# Patient Record
Sex: FEMALE | Race: WHITE | NOT HISPANIC OR LATINO | URBAN - METROPOLITAN AREA
[De-identification: names, ages, dates, MRNs, and addresses within clinical notes are randomized per-mention and may not be internally consistent; named-entity substitution may affect disease eponyms.]

---

## 2017-08-27 ENCOUNTER — RADIANT APPOINTMENT (OUTPATIENT)
Dept: GENERAL RADIOLOGY | Facility: CLINIC | Age: 47
End: 2017-08-27
Attending: FAMILY MEDICINE
Payer: COMMERCIAL

## 2017-08-27 ENCOUNTER — OFFICE VISIT (OUTPATIENT)
Dept: URGENT CARE | Facility: URGENT CARE | Age: 47
End: 2017-08-27
Payer: COMMERCIAL

## 2017-08-27 VITALS
TEMPERATURE: 98.8 F | OXYGEN SATURATION: 100 % | SYSTOLIC BLOOD PRESSURE: 110 MMHG | WEIGHT: 145 LBS | HEART RATE: 56 BPM | DIASTOLIC BLOOD PRESSURE: 66 MMHG

## 2017-08-27 DIAGNOSIS — S83.412A SPRAIN OF MEDIAL COLLATERAL LIGAMENT OF LEFT KNEE, INITIAL ENCOUNTER: Primary | ICD-10-CM

## 2017-08-27 DIAGNOSIS — S83.412A SPRAIN OF MEDIAL COLLATERAL LIGAMENT OF LEFT KNEE, INITIAL ENCOUNTER: ICD-10-CM

## 2017-08-27 PROCEDURE — 73562 X-RAY EXAM OF KNEE 3: CPT | Mod: LT

## 2017-08-27 PROCEDURE — 99203 OFFICE O/P NEW LOW 30 MIN: CPT | Performed by: FAMILY MEDICINE

## 2017-08-27 RX ORDER — FLECAINIDE ACETATE 100 MG/1
100 TABLET ORAL 2 TIMES DAILY
COMMUNITY
End: 2017-10-26

## 2017-08-27 RX ORDER — CETIRIZINE HYDROCHLORIDE 10 MG/1
10 TABLET ORAL DAILY
COMMUNITY

## 2017-08-27 RX ORDER — TOPIRAMATE 50 MG/1
50 TABLET, FILM COATED ORAL 2 TIMES DAILY
COMMUNITY
End: 2024-01-15 | Stop reason: DRUGHIGH

## 2017-08-27 NOTE — MR AVS SNAPSHOT
After Visit Summary   8/27/2017    Cheryl Gill    MRN: [id number]           Patient Information     Date Of Birth          1970        Visit Information        Provider Department      8/27/2017 11:35 AM Mayela Coombs MD [facility]        Today's Diagnoses     Sprain of medial collateral ligament of left knee, initial encounter    -  1      Care Instructions      Knee Sprain of the Collateral Ligaments  The knee is a hinge joint supported by four strong ligaments. The two ligaments inside the knee protect this joint from excess forward and backward movement. The ligaments on the outside of the joint prevent side-to-side motion. These are called the collateral ligaments.  The medial collateral ligament is located on the inner side of the joint; and the lateral collateral ligament is on the outer side of the joint.  You have sprained one or both collateral ligaments. A sprain is a tearing of a ligament. The tear may be partial or complete. Diagnosis is made by physical exam. In the case of an acute injury, the knee may be too swollen or painful to examine fully. A more accurate exam can be done after the initial swelling goes down.  Symptoms of a knee sprain include immediate knee swelling, pain, and difficulty walking. Initial treatment includes rest, splinting the knee to reduce movement of the joint, and icing the knee to reduce swelling and pain. You may use over-the-counter pain medicine to control pain, unless another medicine was prescribed. Most sprains will heal in 3 to 6 weeks. A severe injury can take 3 to 4 months to heal. You will also need rehab exercises. Surgery is usually not required for sprains involving only the collateral ligaments.  Home care    Stay off the injured leg as much as possible until you can walk on it without pain. If you have a lot of pain while walking, crutches, or a walker may be prescribed. These can be rented or  purchased at many pharmacies and surgical or orthopedic supply stores. Follow your healthcare provider s advice about when to begin bearing weight on that leg.     If you were given a hook-and-loop closure knee brace, you can remove it to bathe. But leave it in place when walking, sitting, or lying down unless told otherwise.    Apply an ice pack over the injured area for 15 to 20 minutes every 3 to 6 hours. You should do this for the first 24 to 48 hours. You can make an ice pack by filling a plastic bag that seals at the top with ice cubes and then wrapping it with a thin towel. Continue to use ice packs for relief of pain and swelling as needed. As the ice melts, be careful to avoid getting your wrap, splint, or cast wet. After 48 hours, apply heat (warm shower or warm bath) for 15 to 20 minutes several times a day, or alternate ice and heat. You can place the ice pack directly over the splint. If you have to wear a hook-and-loop knee brace, you can open it to apply the ice pack, or heat, directly to the knee. Never put ice directly on the skin. Always wrap the ice in a towel or other type of cloth.    You may use over-the-counter pain medicine to control pain, unless another pain medicine was prescribed. Anti-inflammatory pain medicines, such as ibuprofen or naproxen may be more effective than acetaminophen. If you have chronic liver or kidney disease or ever had a stomach ulcer or GI bleeding, talk with your healthcare provider before using these medicines.  Follow-up care  Follow up with your healthcare provider as advised. Any X-rays you had today don t show any broken bones, breaks, or fractures. Sometimes fractures don t show up on the first X-ray. Bruises and sprains can sometimes hurt as much as a fracture. These injuries can take time to heal completely. If your symptoms don t improve or they get worse, talk with your healthcare provider. You may need a repeat X-ray. If X-rays were taken, you will be  told of any new findings that may affect your care.  Call 911  Call 911 if you have:     Shortness of breath     Chest pain  When to seek medical advice  Call your healthcare provider right away if any of these occur:    Pain or swelling increases    Swelling, redness, or pain in the calf or thigh  Date Last Reviewed: 11/20/2015 2000-2017 [employer]. [address] [place] [address]. All rights reserved. This information is not intended as a substitute for professional medical care. Always follow your healthcare professional's instructions.        Understanding Medial Collateral Ligament Sprain    The knee is a complex joint where the thighbone (femur) meets the shinbone (tibia). Strong tissues called ligaments connect these bones together. Ligaments also keep the bones aligned, so the knee only bends how it is supposed to. The medial collateral ligament (MCL) runs across the knee joint on the medial side of the leg. Injury to this ligament may be very painful. The knee may also not work the way it should.  Causes of an MCL sprain  An MCL sprain often happens when the knee joint is pushed beyond its normal range of motion. It is most common during a blow to the knee from the outside, pushing the knee inward. It may also happen if the knee is forced into a twist. These movements stretch and tear the MCL. Other parts of the knee may be damaged along with the MCL.  Symptoms of an MCL sprain  These include:    Knee pain    Knee swelling    Locking of the joint    Wobbly or unstable feeling in the joint  Treatment for an MCL sprain  Treatment will depend on the severity of the sprain and whether there is damage to other parts of the knee. Options often include:    Rest. This allows the knee to heal. Activities that stress the knee should be avoided. Crutches, a knee brace, or both may also be recommended for a short time.    Cold packs and elevation of the knee. These help reduce swelling and  relieve pain.    Compression. The knee may be wrapped with a bandage to help reduce swelling.    Medicines. These help relieve pain and swelling.    Exercises. These help improve the knee s stability, strength, and range of motion.  If the injury is severe or several parts of the joint are involved, surgery may be an option. Surgery repairs the MCL and any other damaged structures.     When to call your healthcare provider  Call your healthcare provider right away if you have any of these:    Pain, swelling, or instability that doesn t get better with treatment or gets worse    New symptoms   Date Last Reviewed: 3/10/2016    8636-3149 [employer]. [address] [place] [address]. All rights reserved. This information is not intended as a substitute for professional medical care. Always follow your healthcare professional's instructions.        Treating Medial Collateral Ligament (MCL) Problems  There are 2 options for treating an MCL injury: nonsurgical and surgical. Nonsurgical treatment is used much more often. With either option, rehabilitation will be part of your treatment.  Pre-op checklist    Stop taking aspirin and other medicines as advised by your doctor 7 days before surgery.    Arrange to get properly sized crutches to use during recovery.    Don t eat or drink 10 to 12 hours before surgery (or advised by your doctor).    Arrange for someone to drive you home after surgery.   Nonsurgical treatment  This treatment starts with rest, ice, and elevation. This relieves pain and swelling. In the next stage, you begin exercises designed to increase your knee s range of motion, strength, and flexibility. You may need a brace for weeks after your injury. Using crutches or a brace rests your joint, helping it to heal.     Your doctor may secure the ligament to the bone with screws.         Your doctor may stitch or staple your ligament together.    Surgery  Surgery is seldom used to repair  an MCL injury, however, sometimes it is advised, especially if another part of your knee is damaged. Open surgery is used to screw or stitch the MCL back into place. If repair of the original MCL is not possible, an MCL graft may be used. Depending on their location, other knee injuries may be repaired using arthroscopy. With arthroscopy, a tiny camera lets your doctor see inside the joint. Tools inserted through small incisions are used to repair the joint.  After surgery  Right after surgery, you ll spend a few hours in a recovery unit. Your knee will be bandaged, ice will be applied,  and your leg elevated. Depending on the surgery performed, physical therapy may begin shortly after. A brace and crutches are typically used after surgery. You may have restrictions on weight bearing and activity during your healing process.  Date Last Reviewed: 9/8/2015 2000-2017 [employer]. [address] [place] [address]. All rights reserved. This information is not intended as a substitute for professional medical care. Always follow your healthcare professional's instructions.                Follow-ups after your visit        Who to contact     If you have questions or need follow up information about today's clinic visit or your schedule please contact [facility] directly at [phone].  Normal or non-critical lab and imaging results will be communicated to you by MyChart, letter or phone within 4 business days after the clinic has received the results. If you do not hear from us within 7 days, please contact the clinic through [employer] or phone. If you have a critical or abnormal lab result, we will notify you by phone as soon as possible.  Submit refill requests through [employer] or call your pharmacy and they will forward the refill request to us. Please allow 3 business days for your refill to be completed.          Additional Information About Your Visit       "  MyChart Information     [employer] lets you send messages to your doctor, view your test results, renew your prescriptions, schedule appointments and more. To sign up, go to [email or web address] . Click on \"Log in\" on the left side of the screen, which will take you to the Welcome page. Then click on \"Sign up Now\" on the right side of the page.     You will be asked to enter the access code listed below, as well as some personal information. Please follow the directions to create your username and password.     Your access code is: [email or web address]  Expires: 2017 12:55 PM     Your access code will  in 90 days. If you need help or a new code, please call your [facility] clinic or [phone].        Care EveryWhere ID     This is your Care EveryWhere ID. This could be used by other organizations to access your [facility] medical records  [id number]        Your Vitals Were     Pulse Temperature Pulse Oximetry             56 98.8  F (37.1  C) (Oral) 100%          Blood Pressure from Last 3 Encounters:   17 110/66    Weight from Last 3 Encounters:   17 145 lb (65.8 kg)                 Today's Medication Changes          These changes are accurate as of: 17 12:55 PM.  If you have any questions, ask your nurse or doctor.               Start taking these medicines.        Dose/Directions    order for DME   Used for:  Sprain of medial collateral ligament of left knee, initial encounter   Started by:  Mayela Coombs MD        Equipment being ordered: Knee sleeve   Quantity:  1 Device   Refills:  0            Where to get your medicines      Some of these will need a paper prescription and others can be bought over the counter.  Ask your nurse if you have questions.     Bring a paper prescription for each of these medications     order for DME                Primary Care Provider    None Specified       No primary provider on file.        Equal Access to Services     ALFONSO HOLLIS AH: Severiano singh " valentin Barney, wahayesda luqadaha, qaybta kaalmada orlando, rodrigo ramirezadryan jared. So [facility] [phone].    ATENCIÓN: Si habla español, tiene a herbert disposición servicios gratuitos de asistencia lingüística. Tamara al [phone].    We comply with applicable federal civil rights laws and [place] laws. We do not discriminate on the basis of race, color, national origin, age, disability sex, sexual orientation or gender identity.            Thank you!     Thank you for choosing [facility]  for your care. Our goal is always to provide you with excellent care. Hearing back from our patients is one way we can continue to improve our services. Please take a few minutes to complete the written survey that you may receive in the mail after your visit with us. Thank you!             Your Updated Medication List - Protect others around you: Learn how to safely use, store and throw away your medicines at www.disposemymeds.org.          This list is accurate as of: 8/27/17 12:55 PM.  Always use your most recent med list.                   Brand Name Dispense Instructions for use Diagnosis    cetirizine 10 MG tablet    zyrTEC     Take 10 mg by mouth daily        FIORINAL PO           flecainide 100 MG tablet    TAMBOCOR     Take 100 mg by mouth 2 times daily        FLONASE NA           MIDRIN PO           order for DME     1 Device    Equipment being ordered: Knee sleeve    Sprain of medial collateral ligament of left knee, initial encounter       TOPAMAX 50 MG tablet   Generic drug:  topiramate      Take 50 mg by mouth 2 times daily

## 2017-08-27 NOTE — NURSING NOTE
Chief Complaint   Patient presents with     Knee Pain     lt knee pain for past week       Initial /66 (BP Location: Right arm, Patient Position: Chair, Cuff Size: Adult Regular)  Pulse 56  Temp 98.8  F (37.1  C) (Oral)  Wt 145 lb (65.8 kg)  SpO2 100% There is no height or weight on file to calculate BMI.  Medication Reconciliation: complete Lan TOLLIVER

## 2017-08-27 NOTE — PATIENT INSTRUCTIONS
Knee Sprain of the Collateral Ligaments  The knee is a hinge joint supported by four strong ligaments. The two ligaments inside the knee protect this joint from excess forward and backward movement. The ligaments on the outside of the joint prevent side-to-side motion. These are called the collateral ligaments.  The medial collateral ligament is located on the inner side of the joint; and the lateral collateral ligament is on the outer side of the joint.  You have sprained one or both collateral ligaments. A sprain is a tearing of a ligament. The tear may be partial or complete. Diagnosis is made by physical exam. In the case of an acute injury, the knee may be too swollen or painful to examine fully. A more accurate exam can be done after the initial swelling goes down.  Symptoms of a knee sprain include immediate knee swelling, pain, and difficulty walking. Initial treatment includes rest, splinting the knee to reduce movement of the joint, and icing the knee to reduce swelling and pain. You may use over-the-counter pain medicine to control pain, unless another medicine was prescribed. Most sprains will heal in 3 to 6 weeks. A severe injury can take 3 to 4 months to heal. You will also need rehab exercises. Surgery is usually not required for sprains involving only the collateral ligaments.  Home care    Stay off the injured leg as much as possible until you can walk on it without pain. If you have a lot of pain while walking, crutches, or a walker may be prescribed. These can be rented or purchased at many pharmacies and surgical or orthopedic supply stores. Follow your healthcare provider s advice about when to begin bearing weight on that leg.     If you were given a hook-and-loop closure knee brace, you can remove it to bathe. But leave it in place when walking, sitting, or lying down unless told otherwise.    Apply an ice pack over the injured area for 15 to 20 minutes every 3 to 6 hours. You should do this  for the first 24 to 48 hours. You can make an ice pack by filling a plastic bag that seals at the top with ice cubes and then wrapping it with a thin towel. Continue to use ice packs for relief of pain and swelling as needed. As the ice melts, be careful to avoid getting your wrap, splint, or cast wet. After 48 hours, apply heat (warm shower or warm bath) for 15 to 20 minutes several times a day, or alternate ice and heat. You can place the ice pack directly over the splint. If you have to wear a hook-and-loop knee brace, you can open it to apply the ice pack, or heat, directly to the knee. Never put ice directly on the skin. Always wrap the ice in a towel or other type of cloth.    You may use over-the-counter pain medicine to control pain, unless another pain medicine was prescribed. Anti-inflammatory pain medicines, such as ibuprofen or naproxen may be more effective than acetaminophen. If you have chronic liver or kidney disease or ever had a stomach ulcer or GI bleeding, talk with your healthcare provider before using these medicines.  Follow-up care  Follow up with your healthcare provider as advised. Any X-rays you had today don t show any broken bones, breaks, or fractures. Sometimes fractures don t show up on the first X-ray. Bruises and sprains can sometimes hurt as much as a fracture. These injuries can take time to heal completely. If your symptoms don t improve or they get worse, talk with your healthcare provider. You may need a repeat X-ray. If X-rays were taken, you will be told of any new findings that may affect your care.  Call 911  Call 911 if you have:     Shortness of breath     Chest pain  When to seek medical advice  Call your healthcare provider right away if any of these occur:    Pain or swelling increases    Swelling, redness, or pain in the calf or thigh  Date Last Reviewed: 11/20/2015 2000-2017 [employer]. [address] [place] [address], [place] [address]. All rights reserved.  This information is not intended as a substitute for professional medical care. Always follow your healthcare professional's instructions.        Understanding Medial Collateral Ligament Sprain    The knee is a complex joint where the thighbone (femur) meets the shinbone (tibia). Strong tissues called ligaments connect these bones together. Ligaments also keep the bones aligned, so the knee only bends how it is supposed to. The medial collateral ligament (MCL) runs across the knee joint on the medial side of the leg. Injury to this ligament may be very painful. The knee may also not work the way it should.  Causes of an MCL sprain  An MCL sprain often happens when the knee joint is pushed beyond its normal range of motion. It is most common during a blow to the knee from the outside, pushing the knee inward. It may also happen if the knee is forced into a twist. These movements stretch and tear the MCL. Other parts of the knee may be damaged along with the MCL.  Symptoms of an MCL sprain  These include:    Knee pain    Knee swelling    Locking of the joint    Wobbly or unstable feeling in the joint  Treatment for an MCL sprain  Treatment will depend on the severity of the sprain and whether there is damage to other parts of the knee. Options often include:    Rest. This allows the knee to heal. Activities that stress the knee should be avoided. Crutches, a knee brace, or both may also be recommended for a short time.    Cold packs and elevation of the knee. These help reduce swelling and relieve pain.    Compression. The knee may be wrapped with a bandage to help reduce swelling.    Medicines. These help relieve pain and swelling.    Exercises. These help improve the knee s stability, strength, and range of motion.  If the injury is severe or several parts of the joint are involved, surgery may be an option. Surgery repairs the MCL and any other damaged structures.     When to call your healthcare provider  Call  your healthcare provider right away if you have any of these:    Pain, swelling, or instability that doesn t get better with treatment or gets worse    New symptoms   Date Last Reviewed: 3/10/2016    7247-9465 [employer]. [address] [place] [address], [place] [address]. All rights reserved. This information is not intended as a substitute for professional medical care. Always follow your healthcare professional's instructions.        Treating Medial Collateral Ligament (MCL) Problems  There are 2 options for treating an MCL injury: nonsurgical and surgical. Nonsurgical treatment is used much more often. With either option, rehabilitation will be part of your treatment.  Pre-op checklist    Stop taking aspirin and other medicines as advised by your doctor 7 days before surgery.    Arrange to get properly sized crutches to use during recovery.    Don t eat or drink 10 to 12 hours before surgery (or advised by your doctor).    Arrange for someone to drive you home after surgery.   Nonsurgical treatment  This treatment starts with rest, ice, and elevation. This relieves pain and swelling. In the next stage, you begin exercises designed to increase your knee s range of motion, strength, and flexibility. You may need a brace for weeks after your injury. Using crutches or a brace rests your joint, helping it to heal.     Your doctor may secure the ligament to the bone with screws.         Your doctor may stitch or staple your ligament together.    Surgery  Surgery is seldom used to repair an MCL injury, however, sometimes it is advised, especially if another part of your knee is damaged. Open surgery is used to screw or stitch the MCL back into place. If repair of the original MCL is not possible, an MCL graft may be used. Depending on their location, other knee injuries may be repaired using arthroscopy. With arthroscopy, a tiny camera lets your doctor see inside the joint. Tools inserted through small incisions  are used to repair the joint.  After surgery  Right after surgery, you ll spend a few hours in a recovery unit. Your knee will be bandaged, ice will be applied,  and your leg elevated. Depending on the surgery performed, physical therapy may begin shortly after. A brace and crutches are typically used after surgery. You may have restrictions on weight bearing and activity during your healing process.  Date Last Reviewed: 9/8/2015 2000-2017 [employer]. [address] [place] [address], [place] [address]. All rights reserved. This information is not intended as a substitute for professional medical care. Always follow your healthcare professional's instructions.

## 2017-08-27 NOTE — PROGRESS NOTES
SUBJECTIVE:  Chief Complaint   Patient presents with     Knee Pain     lt knee pain for past week     Cheryl Gill is a 46 year old female who has had a left knee pain since 1 weeks ago. Mechanism of injury: unknown . Immediate symptoms: insidious onset of pain, was able to bear weight  , no deformity was noted by the patient. Symptoms have been unchanged since that time. Prior history of related problems: no prior problems with this area in the past.    PMH-  Migraine    ALLERGIES:  Adhesive tape; Imitrex [sumatriptan]; Metal [staples]; and Nsaids     Meds,- flecainide, topiramate, flonase, fiorinal, midrin      No current outpatient prescriptions on file prior to visit.  No current facility-administered medications on file prior to visit.     Social History   Substance Use Topics     Smoking status: Current Every Day Smoker     Smokeless tobacco: Never Used      Comment: 3-4 per day     Alcohol use Not on file       No family history on file.     ROS:  CONSTITUTIONAL:NEGATIVE for fever, chills, change in weight  INTEGUMENTARY/SKIN: NEGATIVE for worrisome rashes, moles or lesions  EYES: NEGATIVE for vision changes or irritation  ENT/MOUTH: NEGATIVE for ear, mouth and throat problems  RESP:NEGATIVE for significant cough or SOB  GI: NEGATIVE for nausea, abdominal pain, heartburn, or change in bowel habits    OBJECTIVE:  /66 (BP Location: Right arm, Patient Position: Chair, Cuff Size: Adult Regular)  Pulse 56  Temp 98.8  F (37.1  C) (Oral)  Wt 145 lb (65.8 kg)  SpO2 100%  Appearance: well developed and well nourished and cooperative.    Knee exam: left    no pain with ROM of the patella       pain with stress to the medial collateral ligament-  Pain with local palpation medial knee  no pain with stress to the lateral collateral ligament  no pain with compression of the meniscus in the medial and lateral compartments  no knee instability with Lachman     able to bear weight and ambulate with  mild  pain  There is not compromise to the distal circulation. Distal pulses are 2+ and capillary refill is brisk.  Motor and sensory function distal to the injured knee is normal  X-ray: no fracture or dislocation noted, pending review by Radiologist.    ASSESSMENT:  Sprain of medial collateral ligament of left knee, initial encounter     - XR Knee Left 3 Views; Future  - order for DME; Equipment being ordered: Knee sleeve    acetaminophen as alternative for pain  Follow-up with primary care or othopedics if persistent symptoms after 2 weeks     Knee sleeve

## 2017-09-14 ENCOUNTER — HOSPITAL ENCOUNTER (EMERGENCY)
Facility: CLINIC | Age: 47
Discharge: HOME | End: 2017-09-14
Attending: EMERGENCY MEDICINE | Admitting: EMERGENCY MEDICINE
Payer: COMMERCIAL

## 2017-09-14 VITALS
HEIGHT: 66 IN | HEART RATE: 59 BPM | SYSTOLIC BLOOD PRESSURE: 107 MMHG | DIASTOLIC BLOOD PRESSURE: 66 MMHG | RESPIRATION RATE: 16 BRPM | OXYGEN SATURATION: 98 % | TEMPERATURE: 97.5 F

## 2017-09-14 DIAGNOSIS — R55 NEAR SYNCOPE: ICD-10-CM

## 2017-09-14 LAB
ANION GAP SERPL CALCULATED.3IONS-SCNC: 11 MMOL/L (ref 3–14)
BASOPHILS # BLD AUTO: 0 10E9/L (ref 0–0.2)
BASOPHILS NFR BLD AUTO: 0.2 %
BUN SERPL-MCNC: 12 MG/DL (ref 7–30)
CALCIUM SERPL-MCNC: 8.7 MG/DL (ref 8.5–10.1)
CHLORIDE SERPL-SCNC: 107 MMOL/L (ref 94–109)
CO2 SERPL-SCNC: 20 MMOL/L (ref 20–32)
CREAT SERPL-MCNC: 0.79 MG/DL (ref 0.52–1.04)
DIFFERENTIAL METHOD BLD: NORMAL
EOSINOPHIL # BLD AUTO: 0.1 10E9/L (ref 0–0.7)
EOSINOPHIL NFR BLD AUTO: 1.5 %
ERYTHROCYTE [DISTWIDTH] IN BLOOD BY AUTOMATED COUNT: 12.4 % (ref 10–15)
GFR SERPL CREATININE-BSD FRML MDRD: 79 ML/MIN/1.7M2
GLUCOSE SERPL-MCNC: 147 MG/DL (ref 70–99)
HCT VFR BLD AUTO: 46.2 % (ref 35–47)
HGB BLD-MCNC: 15.7 G/DL (ref 11.7–15.7)
IMM GRANULOCYTES # BLD: 0 10E9/L (ref 0–0.4)
IMM GRANULOCYTES NFR BLD: 0.3 %
LYMPHOCYTES # BLD AUTO: 1.9 10E9/L (ref 0.8–5.3)
LYMPHOCYTES NFR BLD AUTO: 20.2 %
MAGNESIUM SERPL-MCNC: 1.9 MG/DL (ref 1.6–2.3)
MCH RBC QN AUTO: 31.5 PG (ref 26.5–33)
MCHC RBC AUTO-ENTMCNC: 34 G/DL (ref 31.5–36.5)
MCV RBC AUTO: 93 FL (ref 78–100)
MONOCYTES # BLD AUTO: 0.9 10E9/L (ref 0–1.3)
MONOCYTES NFR BLD AUTO: 9.6 %
NEUTROPHILS # BLD AUTO: 6.4 10E9/L (ref 1.6–8.3)
NEUTROPHILS NFR BLD AUTO: 68.2 %
NRBC # BLD AUTO: 0 10*3/UL
NRBC BLD AUTO-RTO: 0 /100
PLATELET # BLD AUTO: 182 10E9/L (ref 150–450)
POTASSIUM SERPL-SCNC: 3.5 MMOL/L (ref 3.4–5.3)
RBC # BLD AUTO: 4.98 10E12/L (ref 3.8–5.2)
SODIUM SERPL-SCNC: 138 MMOL/L (ref 133–144)
TROPONIN I SERPL-MCNC: <0.015 UG/L (ref 0–0.04)
TSH SERPL DL<=0.005 MIU/L-ACNC: 3.25 MU/L (ref 0.4–4)
WBC # BLD AUTO: 9.4 10E9/L (ref 4–11)

## 2017-09-14 PROCEDURE — 85025 COMPLETE CBC W/AUTO DIFF WBC: CPT | Performed by: EMERGENCY MEDICINE

## 2017-09-14 PROCEDURE — 93010 ELECTROCARDIOGRAM REPORT: CPT | Mod: Z6 | Performed by: EMERGENCY MEDICINE

## 2017-09-14 PROCEDURE — 93010 ELECTROCARDIOGRAM REPORT: CPT | Mod: 76 | Performed by: EMERGENCY MEDICINE

## 2017-09-14 PROCEDURE — 84443 ASSAY THYROID STIM HORMONE: CPT | Performed by: EMERGENCY MEDICINE

## 2017-09-14 PROCEDURE — 80048 BASIC METABOLIC PNL TOTAL CA: CPT | Performed by: EMERGENCY MEDICINE

## 2017-09-14 PROCEDURE — 93005 ELECTROCARDIOGRAM TRACING: CPT | Mod: 76 | Performed by: EMERGENCY MEDICINE

## 2017-09-14 PROCEDURE — 83735 ASSAY OF MAGNESIUM: CPT | Performed by: EMERGENCY MEDICINE

## 2017-09-14 PROCEDURE — 84484 ASSAY OF TROPONIN QUANT: CPT | Performed by: EMERGENCY MEDICINE

## 2017-09-14 PROCEDURE — 99285 EMERGENCY DEPT VISIT HI MDM: CPT | Mod: 25 | Performed by: EMERGENCY MEDICINE

## 2017-09-14 PROCEDURE — 93226 XTRNL ECG REC<48 HR SCAN A/R: CPT | Performed by: EMERGENCY MEDICINE

## 2017-09-14 PROCEDURE — 93005 ELECTROCARDIOGRAM TRACING: CPT | Performed by: EMERGENCY MEDICINE

## 2017-09-14 NOTE — ED PROVIDER NOTES
"  History     Chief Complaint   Patient presents with     Shortness of Breath     Dizziness     Palpitations     HPI  Cheryl Gill is a 46 year old female with history of SVT on flecainide who presents after 2 near syncopal episodes. Episodes associated with diaphoresis and lightheadedness, chest discomfort, and dyspnea. She was noted to be in ventricular bigeminy on arrival. No recent illness. On flecainide for 8 years. No history of thyroid disease or electrolyte disorder. No drug or alcohol use. Cigarette smoker. No fever.  I have reviewed the Medications, Allergies, Past Medical and Surgical History, and Social History in the [employer] system.  Past Medical History:   Diagnosis Date     Fibromyalgia      SVT (supraventricular tachycardia) (H)        Review of Systems   Constitutional: Positive for diaphoresis. Negative for activity change, chills, fatigue and fever.   HENT: Negative for congestion and rhinorrhea.    Eyes: Negative for visual disturbance.   Respiratory: Positive for cough. Negative for chest tightness.    Cardiovascular: Positive for chest pain and palpitations. Negative for leg swelling.   Gastrointestinal: Positive for nausea. Negative for abdominal pain and vomiting.   Musculoskeletal: Negative for arthralgias, back pain, myalgias and neck pain.   Skin: Negative for rash.   Allergic/Immunologic: Negative for immunocompromised state.   Neurological: Positive for dizziness and light-headedness. Negative for syncope, weakness and headaches.   Hematological: Does not bruise/bleed easily.   Psychiatric/Behavioral: Negative for confusion.       Physical Exam   BP: 132/81  Pulse: 79  Temp: 97.5  F (36.4  C)  Resp: 16  Height: 167.6 cm (5' 6\")  SpO2: 100 %  Physical Exam   Constitutional: She appears well-developed and well-nourished. No distress.   HENT:   Head: Normocephalic and atraumatic.   Mouth/Throat: Oropharynx is clear and moist.   Eyes: Conjunctivae and EOM are normal. Pupils are equal, round, " and reactive to light.   Neck: Normal range of motion. Neck supple.   Cardiovascular: Normal rate, regular rhythm, normal heart sounds and intact distal pulses.    Pulmonary/Chest: Effort normal and breath sounds normal. No respiratory distress.   Abdominal: Soft. There is no tenderness.   Musculoskeletal: Normal range of motion. She exhibits no edema or tenderness.   Neurological: She is alert.   Skin: Skin is warm and dry.   Psychiatric: She has a normal mood and affect. Her behavior is normal.   Nursing note and vitals reviewed.      ED Course     ED Course     Procedures             EKG Interpretation:      Interpreted by Obed Linton  Time reviewed: 5978  Symptoms at time of EKG: none   Rhythm: normal sinus   Rate: normal  Axis: normal  Ectopy: none  Conduction: normal  ST Segments/ T Waves: No ST-T wave changes  Q Waves: none  Comparison to prior: No old EKG available    Clinical Impression: normal EKG         EKG Interpretation:      Interpreted by Obde Linton  Time reviewed:2516   Symptoms at time of EKG: None   Rhythm: Normal sinus  and frequent PVC's in bigeminy pattern.  Rate: Normal  Axis: Normal  Ectopy: Bigeminy  Conduction: Normal  ST Segments/ T Waves: No ST-T wave changes and No acute ischemic changes  Q Waves: None  Comparison to prior: No old EKG available    Clinical Impression: sinus rhythm with ventricular bigeminy          Critical Care time:  none           Labs Ordered and Resulted from Time of ED Arrival Up to the Time of Departure from the ED   BASIC METABOLIC PANEL - Abnormal; Notable for the following:        Result Value    Glucose 147 (*)     All other components within normal limits   TROPONIN I   CBC WITH PLATELETS DIFFERENTIAL   MAGNESIUM   TSH            Assessments & Plan (with Medical Decision Making)   Near syncopal episodes. On flecainide. History of SVT. Ventricular bigeminy noted on first EKG. Discussed with cardiology staff. Admission advised but the patient wants to  leave against medical advise. She has normal mental status and appears capable of making her own medical care decisions. Discussed with her at length and several times the risks involved in leaving including cardiac arrest, death, syncope, injury, morbidity and mortality. I discussed the case again with cardiology after she decided to leave. Again I strongly advised admission but she continued to refuse admission. Will try to at least set up for Holter monitor.     I have reviewed the nursing notes.    I have reviewed the findings, diagnosis, plan and need for follow up with the patient.    Discharge Medication List as of 9/14/2017  6:47 AM          Final diagnoses:   Near syncope       9/14/2017   [facility], [place], EMERGENCY DEPARTMENT     Obed Melendez MD  10/07/17 3249

## 2017-09-14 NOTE — DISCHARGE INSTRUCTIONS
You are leaving against medical advise.   Please return at any time for further evaluation and treatment.  Follow up with your primary care provider and with cardiology as soon as possible. Call today as soon as you can to schedule these.  Return immediately if recurrent symptoms.  Do not drive or do other activities that could be dangerous with these episodes.     Please make an appointment to follow up with Cardiology Clinic (phone: [phone]) as soon as possible.    Please make an appointment to follow up with Primary Care Center (phone: [phone] as soon as possible.    Holter monitor set up this morning before you leave.

## 2017-09-14 NOTE — ED AVS SNAPSHOT
[facility], Emergency Department    [address] [address]    Phone:  [phone]                                       Cheryl Gill   MRN: [id number]    Department:  [facility], Emergency Department   Date of Visit:  9/14/2017           Patient Information     Date Of Birth          1970        Your diagnoses for this visit were:     Near syncope        You were seen by Obed Melendez MD.        Discharge Instructions       You are leaving against medical advise.   Please return at any time for further evaluation and treatment.  Follow up with your primary care provider and with cardiology as soon as possible. Call today as soon as you can to schedule these.  Return immediately if recurrent symptoms.  Do not drive or do other activities that could be dangerous with these episodes.     Please make an appointment to follow up with Cardiology Clinic (phone: [phone]) as soon as possible.    Please make an appointment to follow up with Primary Care Center (phone: [phone] as soon as possible.    Holter monitor set up this morning before you leave.    24 Hour Appointment Hotline       To make an appointment at any [facility] clinic, call [phone] ([phone]). If you don't have a family doctor or clinic, we will help you find one. [facility] clinics are conveniently located to serve the needs of you and your family.             Review of your medicines      Our records show that you are taking the medicines listed below. If these are incorrect, please call your family doctor or clinic.        Dose / Directions Last dose taken    cetirizine 10 MG tablet   Commonly known as:  zyrTEC   Dose:  10 mg        Take 10 mg by mouth daily   Refills:  0        FIORINAL PO        Refills:  0        flecainide 100 MG tablet   Commonly known as:  TAMBOCOR   Dose:  100 mg        Take 100 mg by mouth 2 times daily   Refills:  0        FLONASE NA        Refills:  0        MIDRIN PO      "   Refills:  0        order for DME   Quantity:  1 Device        Equipment being ordered: Knee sleeve   Refills:  0        TOPAMAX 50 MG tablet   Dose:  50 mg   Generic drug:  topiramate        Take 50 mg by mouth 2 times daily   Refills:  0                Procedures and tests performed during your visit     Basic metabolic panel    CBC with platelets differential    EKG 12 lead    Magnesium    TSH    Troponin I      Orders Needing Specimen Collection     None      Pending Results     No orders found from 9/12/2017 to 9/15/2017.            Pending Culture Results     No orders found from 9/12/2017 to 9/15/2017.            Pending Results Instructions     If you had any lab results that were not finalized at the time of your Discharge, you can call the ED Lab Result RN at [phone]. You will be contacted by this team for any positive Lab results or changes in treatment. The nurses are available 7 days a week from 10A to 6:30P.  You can leave a message 24 hours per day and they will return your call.        Thank you for choosing [facility]       Thank you for choosing [facility] for your care. Our goal is always to provide you with excellent care. Hearing back from our patients is one way we can continue to improve our services. Please take a few minutes to complete the written survey that you may receive in the mail after you visit with us. Thank you!        [employer] Information     [employer] lets you send messages to your doctor, view your test results, renew your prescriptions, schedule appointments and more. To sign up, go to [email or web address] . Click on \"Log in\" on the left side of the screen, which will take you to the Welcome page. Then click on \"Sign up Now\" on the right side of the page.     You will be asked to enter the access code listed below, as well as some personal information. Please follow the directions to create your username and password.     Your access code is: [email or web address]  Expires: " 2017 12:55 PM     Your access code will  in 90 days. If you need help or a new code, please call your [facility] clinic or [phone].        Care EveryWhere ID     This is your Care EveryWhere ID. This could be used by other organizations to access your [facility] medical records  [id number]        Equal Access to Services     ALFONSO HOLLIS : Severiano Barney, aura esteban, bala briceñoalcarlos perry, rodrigo gibson . So [facility] [phone].    ATENCIÓN: Si habla español, tiene a herbert disposición servicios gratuitos de asistencia lingüística. Llame al [phone].    We comply with applicable federal civil rights laws and [place] laws. We do not discriminate on the basis of race, color, national origin, age, disability sex, sexual orientation or gender identity.            After Visit Summary       This is your record. Keep this with you and show to your community pharmacist(s) and doctor(s) at your next visit.

## 2017-09-14 NOTE — ED NOTES
Pt presents to ED with complaints of SOB, feeling lightheaded, chest tightness and palpitations that started yesterday and has been intermittent ever since. Pt has hx of SVT that was diagnosed 7 years a go.

## 2017-09-15 PROBLEM — I47.10 SVT (SUPRAVENTRICULAR TACHYCARDIA) (H): Status: ACTIVE | Noted: 2017-09-15

## 2017-09-15 PROBLEM — M79.7 FIBROMYALGIA: Status: ACTIVE | Noted: 2017-09-15

## 2017-09-15 PROBLEM — F17.200 NICOTINE DEPENDENCE: Status: ACTIVE | Noted: 2017-09-15

## 2017-09-15 LAB
INTERPRETATION ECG - MUSE: NORMAL
INTERPRETATION ECG - MUSE: NORMAL

## 2017-09-18 ENCOUNTER — OFFICE VISIT (OUTPATIENT)
Dept: CARDIOLOGY | Facility: CLINIC | Age: 47
End: 2017-09-18
Payer: COMMERCIAL

## 2017-09-18 VITALS
BODY MASS INDEX: 23.67 KG/M2 | HEIGHT: 66 IN | WEIGHT: 147.3 LBS | SYSTOLIC BLOOD PRESSURE: 110 MMHG | DIASTOLIC BLOOD PRESSURE: 60 MMHG | HEART RATE: 56 BPM

## 2017-09-18 DIAGNOSIS — I49.3 PVC'S (PREMATURE VENTRICULAR CONTRACTIONS): Primary | ICD-10-CM

## 2017-09-18 PROCEDURE — 99203 OFFICE O/P NEW LOW 30 MIN: CPT | Performed by: INTERNAL MEDICINE

## 2017-09-18 NOTE — MR AVS SNAPSHOT
After Visit Summary   9/18/2017    Cheryl Gill    MRN: [id number]           Patient Information     Date Of Birth          1970        Visit Information        Provider Department      9/18/2017 1:15 PM Abhi Vo MD [facility]        Today's Diagnoses     PVC's (premature ventricular contractions)    -  1       Follow-ups after your visit        Additional Services     Follow-Up with Electrophysiologist           Follow-Up with Cardiologist                 Your next 10 appointments already scheduled     Sep 29, 2017  3:45 PM CDT   Event Monitor with [facility] ([facility])    [address]  [place] [address]   [phone]           LOCATION - [address] [place] [address], [address] [place] [address] **Please check-in at the [place] Registration Office, [address], in the [facility] building. When you are finished registering, please go to [address] and have a seat.            Oct 11, 2017 12:45 PM CDT   EP NEW with Carole Moore MD   [facility] ([facility])    [address]  [place] [address]   [phone]            Nov 27, 2017 11:15 AM CST   Return Visit with Abhi Vo MD   [facility] ([facility])    [address]  [place] [address]   [phone]              Future tests that were ordered for you today     Open Future Orders        Priority Expected Expires Ordered    Follow-Up with Cardiologist Routine 11/16/2017 9/18/2018 9/18/2017    Cardiac Event Monitor - Peds/Adult Routine 9/25/2017 9/18/2018 9/18/2017    Follow-Up with Electrophysiologist Routine 9/25/2017 9/18/2018 9/18/2017            Who to contact     If you have questions or need follow up information about today's clinic  "visit or your schedule please contact [facility] [place] directly at [phone].  Normal or non-critical lab and imaging results will be communicated to you by MyChart, letter or phone within 4 business days after the clinic has received the results. If you do not hear from us within 7 days, please contact the clinic through [employer] or phone. If you have a critical or abnormal lab result, we will notify you by phone as soon as possible.  Submit refill requests through [employer] or call your pharmacy and they will forward the refill request to us. Please allow 3 business days for your refill to be completed.          Additional Information About Your Visit        IPDIAhart Information     [employer] gives you secure access to your electronic health record. If you see a primary care provider, you can also send messages to your care team and make appointments. If you have questions, please call your primary care clinic.  If you do not have a primary care provider, please call [phone] and they will assist you.        Care EveryWhere ID     This is your Care EveryWhere ID. This could be used by other organizations to access your [facility] medical records  [id number]        Your Vitals Were     Pulse Height Breastfeeding? BMI (Body Mass Index)          56 1.676 m (5' 6\") No 23.77 kg/m2         Blood Pressure from Last 3 Encounters:   09/18/17 110/60   09/14/17 107/66   08/27/17 110/66    Weight from Last 3 Encounters:   09/18/17 66.8 kg (147 lb 4.8 oz)   08/27/17 65.8 kg (145 lb)               Primary Care Provider    Physician No Ref-Primary       No address on file        Equal Access to Services     ALFONSO HOLLIS : Hadii ashwini murphyo Sodeepaliali, waaxda luqadaha, qaybta kaalmada adeegrodrigo ba . So [facility] [phone].    ATENCIÓN: Si habla español, tiene a herbert disposición servicios gratuitos de asistencia lingüística. Llame al [phone].    We " comply with applicable federal civil rights laws and [place] laws. We do not discriminate on the basis of race, color, national origin, age, disability sex, sexual orientation or gender identity.            Thank you!     Thank you for choosing [facility] [place]  for your care. Our goal is always to provide you with excellent care. Hearing back from our patients is one way we can continue to improve our services. Please take a few minutes to complete the written survey that you may receive in the mail after your visit with us. Thank you!             Your Updated Medication List - Protect others around you: Learn how to safely use, store and throw away your medicines at www.disposemymeds.org.          This list is accurate as of: 9/18/17  2:06 PM.  Always use your most recent med list.                   Brand Name Dispense Instructions for use Diagnosis    cetirizine 10 MG tablet    zyrTEC     Take 10 mg by mouth daily        FIORINAL PO           flecainide 100 MG tablet    TAMBOCOR     Take 100 mg by mouth 2 times daily        FLONASE NA           MIDRIN PO           order for DME     1 Device    Equipment being ordered: Knee sleeve    Sprain of medial collateral ligament of left knee, initial encounter       TOPAMAX 50 MG tablet   Generic drug:  topiramate      Take 50 mg by mouth 2 times daily

## 2017-09-18 NOTE — PROGRESS NOTES
HPI and Plan:   See dictation  Holter Monitor and Event Recorder    Orders Placed This Encounter   Procedures     Follow-Up with Electrophysiologist     Follow-Up with Cardiologist     Cardiac Event Monitor - Peds/Adult     No orders of the defined types were placed in this encounter.    There are no discontinued medications.      Encounter Diagnosis   Name Primary?     PVC's (premature ventricular contractions) Yes       CURRENT MEDICATIONS:  Current Outpatient Prescriptions   Medication Sig Dispense Refill     flecainide (TAMBOCOR) 100 MG tablet Take 100 mg by mouth 2 times daily       topiramate (TOPAMAX) 50 MG tablet Take 50 mg by mouth 2 times daily       cetirizine (ZYRTEC) 10 MG tablet Take 10 mg by mouth daily       Fluticasone Propionate (FLONASE NA)        Butalbital-Aspirin-Caffeine (FIORINAL PO)        Isometheptene-Dichloral-APAP (MIDRIN PO)        order for DME Equipment being ordered: Knee sleeve 1 Device 0       ALLERGIES     Allergies   Allergen Reactions     Adhesive Tape      Imitrex [Sumatriptan]      Metal [Staples]      Nsaids        PAST MEDICAL HISTORY:  Past Medical History:   Diagnosis Date     Fibromyalgia      SVT (supraventricular tachycardia) (H)        PAST SURGICAL HISTORY:  Past Surgical History:   Procedure Laterality Date     HYSTERECTOMY       ORTHOPEDIC SURGERY       TONSILLECTOMY         FAMILY HISTORY:  History reviewed. No pertinent family history.    SOCIAL HISTORY:  Social History     Social History     Marital status: Single     Spouse name: N/A     Number of children: N/A     Years of education: N/A     Social History Main Topics     Smoking status: Current Every Day Smoker     Packs/day: 0.50     Smokeless tobacco: Never Used      Comment: 3-4 per day     Alcohol use No     Drug use: No     Sexual activity: Not Asked     Other Topics Concern     None     Social History Narrative         Review of Systems:  Skin:  Negative       Eyes:  Positive for glasses    ENT:   "Positive for nasal congestion allergies  Respiratory:  Positive for shortness of breath feels unable to get a full breath   Cardiovascular:    palpitations;heaviness;Positive for    Gastroenterology: not assessed      Genitourinary:  not assessed      Musculoskeletal:  not assessed      Neurologic:  Positive for migraine headaches hx of migraines  Psychiatric:  not assessed      Heme/Lymph/Imm:  not assessed      Endocrine:  Negative        Physical Exam:  Vitals: /60 (BP Location: Right arm, Patient Position: Sitting, Cuff Size: Adult Regular)  Pulse 56  Ht 1.676 m (5' 6\")  Wt 66.8 kg (147 lb 4.8 oz)  Breastfeeding? No  BMI 23.77 kg/m2    Constitutional:  cooperative, alert and oriented, well developed, well nourished, in no acute distress        Skin:  warm and dry to the touch        Head:  normocephalic        Eyes:  pupils equal and round;sclera white        ENT:  no pallor or cyanosis        Neck:  JVP normal;no carotid bruit;carotid pulses are full and equal bilaterally;no thyromegaly        Chest:  clear to auscultation          Cardiac: no murmurs, gallops or rubs detected frequent premature beats                Abdomen:  abdomen soft;no masses;non-tender        Vascular: pulses full and equal, no bruits auscultated;pulses full and equal                                        Extremities and Back:  no deformities, clubbing, cyanosis, erythema observed;no edema              Neurological:             Recent Lab Results:  LIPID RESULTS:  No results found for: CHOL, HDL, LDL, TRIG, CHOLHDLRATIO    LIVER ENZYME RESULTS:  No results found for: AST, ALT    CBC RESULTS:  Lab Results   Component Value Date    WBC 9.4 09/14/2017    RBC 4.98 09/14/2017    HGB 15.7 09/14/2017    HCT 46.2 09/14/2017    MCV 93 09/14/2017    MCH 31.5 09/14/2017    MCHC 34.0 09/14/2017    RDW 12.4 09/14/2017     09/14/2017       BMP RESULTS:  Lab Results   Component Value Date     09/14/2017    POTASSIUM 3.5 " 09/14/2017    CHLORIDE 107 09/14/2017    CO2 20 09/14/2017    ANIONGAP 11 09/14/2017     (H) 09/14/2017    BUN 12 09/14/2017    CR 0.79 09/14/2017    GFRESTIMATED 79 09/14/2017    GFRESTBLACK >90 09/14/2017    CHRISTIAN 8.7 09/14/2017        A1C RESULTS:  No results found for: A1C    INR RESULTS:  No results found for: INR        CC  No referring provider defined for this encounter.

## 2017-09-18 NOTE — PROGRESS NOTES
HISTORY OF PRESENT ILLNESS:  Cheryl Gill is 46.  She is a new patient to me.  She has a history over about 8 years of having had palpitations, PVCs, skipped heartbeat.  She saw a cardiologist when these symptoms were initiated and ultimately was evaluated.  She had some lightheadedness and dizziness and apparently she had multiple studies including echo, tilt table, all of which she says did not reveal much in the way of anything abnormal.  She did have chronic PVCs.  Subsequently, she has been on flecainide 100 mg twice a day.      More recently she has felt more vigorous heart beating, a sense of lightheadedness and sweatiness.  It is becoming increasingly frequent.  She has not had true syncope.  She has not had definite dizziness, but just has not felt as well as she used to.  Her heart can beat irregularly and erratically.      He recently went to the emergency room for assessment.  She reported these symptoms and they noticed that she had frequent unifocal PVCs.  EKG showed no significant abnormalities otherwise and was normal.  Frequent patterns of bigeminy were appreciated.      Basal metabolic panel showed a potassium of 3.5.  She is not on a diuretic or birth control pills.      The ventricular bigeminy was confirmed.  They suggested she be admitted; she did not wish to do so.      Subsequently, they gave her a Holter monitor that she wore 09/14/2017.  I do not have those results at the moment.      EKG showed sinus rhythm with frequent PACs, unifocal.      Past medical history is positive for:   1.  Fibromyalgia.   2.  PSVT, although she says as far she knows, she has never had a documented supraventricular tachycardia.  It has always been PVCs.      SURGICAL HISTORY:  Hysterectomy, tonsillectomy and some orthopedic interventions.      FAMILY HISTORY:  Negative for sudden death.      SOCIAL HISTORY:  She is single.  She is .  She has 1 child, who lives in [facility].  She smokes a couple of  cigarettes a day.  She has stopped for periods in the past.  Alcohol - no.  Other drug use - no.        REVIEW OF SYSTEMS:  Listed and reviewed in Epic.  I endorsed findings including HEENT:  Some nasal congestion.  Respiratory:  Positive for a little bit of shortness of breath, cannot get a full breath, palpitations as noted above.  No syncope, no classic chest pressure.  Upper GI, lower GI, , musculoskeletal, neurologic and endocrine are all negative.  Neurologic:  Positive for history of migraines which are currently relatively occasional.      CURRENT MEDICATIONS:   1.  Flecainide 100 mg b.i.d. and has been so chronically.   1.  Topamax 50 mg b.i.d.   2.  Zyrtec 10 mg a day p.r.n.   3.  Flonase.   4.  Fiorinal.   5.  Midodrine.      PHYSICAL EXAMINATION:   GENERAL:  Reveals a trim adult female.  She is bright, alert, oriented and no distress.   VITAL SIGNS:  Blood pressure 110/60, heart rate 50-60 beats per minute with what sounds like ventricular bigeminy.   HEAD:  Normal.   NECK:  Free of neck vein distention or bruit.   HEART:  Regular with every other beat sounding like a PVC.  No murmurs heard.   LUNGS:  Clear.   ABDOMEN:  Soft without organomegaly, mass, pain or guarding.   EXTREMITIES:  Show +2 pedal pulses, no edema.      Her EKG showed a QRS of around 100 milliseconds but it appears normal otherwise.      These symptoms are worrisome.  She has not had true dizziness or syncope, but rather was sweaty and lightheaded.  For the time being, I have arranged for her to have an event recorder.  I have asked her to see one of my EP colleagues as soon as possible.  She will get an echo.  We will do an event recorder.  She may need a stress test.  She may need an EP study and a decision about continuing versus stopping the flecainide.  Currently, I did not tell her to stop it, but that may follow.      IMPRESSION:   1.  History of a relatively normal heart.   2.  Chronic PVCs on flecainide.   3.  New symptoms  that are troublesome suggesting perhaps worsening of arrhythmia, proarrhythmia or other arrhythmias associated from a cardiac standpoint.   4.  Smoker.  I begged and encouraged her to stop.      PLAN:  As above.      Over an hour was spent doing the consult.         KASEY BENITEZ MD, Located within [facility]             D: 2017 14:08   T: 2017 18:25   MT: ROSETTA      Name:     ALEX HERNANDEZ   MRN:      [id number]        Account:      [id number]   :      1970           Service Date: 2017      Document: [id number]

## 2017-09-29 ENCOUNTER — HOSPITAL ENCOUNTER (OUTPATIENT)
Dept: CARDIOLOGY | Facility: CLINIC | Age: 47
Discharge: HOME | End: 2017-09-29
Attending: INTERNAL MEDICINE | Admitting: INTERNAL MEDICINE
Payer: COMMERCIAL

## 2017-09-29 DIAGNOSIS — I49.3 PVC'S (PREMATURE VENTRICULAR CONTRACTIONS): ICD-10-CM

## 2017-09-29 PROCEDURE — 93270 REMOTE 30 DAY ECG REV/REPORT: CPT

## 2017-09-29 PROCEDURE — 93272 ECG/REVIEW INTERPRET ONLY: CPT | Performed by: INTERNAL MEDICINE

## 2017-10-02 ENCOUNTER — DOCUMENTATION ONLY (OUTPATIENT)
Dept: CARDIOLOGY | Facility: CLINIC | Age: 47
End: 2017-10-02

## 2017-10-02 NOTE — PROGRESS NOTES
Cardionet baseline-21 day monitor-Baseline 9/29 showing SR at 65-70 bpm. Sees Dr. Moore on 10/11-[facility]

## 2017-10-03 NOTE — PROGRESS NOTES
"Cardionet transmission from 10/1-Symptomatic trigger-Sinus bradycardia at 49 bpm. Report states \" PVC couplet followed by atrial couplet\". Symptom- ' Heart racing'. [place]  "

## 2017-10-06 NOTE — PROGRESS NOTES
Cardionet 10-4-17 Symptomatic Event 10-4-2017 11:15:35 HR 70bpm symptoms skipped beat with 1 PVC sent to file

## 2017-10-07 ASSESSMENT — ENCOUNTER SYMPTOMS
DIZZINESS: 1
BRUISES/BLEEDS EASILY: 0
NAUSEA: 1
COUGH: 1
MYALGIAS: 0
LIGHT-HEADEDNESS: 1
BACK PAIN: 0
ACTIVITY CHANGE: 0
CONFUSION: 0
ABDOMINAL PAIN: 0
ARTHRALGIAS: 0
WEAKNESS: 0
PALPITATIONS: 1
CHILLS: 0
HEADACHES: 0
RHINORRHEA: 0
DIAPHORESIS: 1
FATIGUE: 0
FEVER: 0
CHEST TIGHTNESS: 0
NECK PAIN: 0
VOMITING: 0

## 2017-10-10 NOTE — PROGRESS NOTES
4 symptomatic rhythm strips from Cardionet 30 day monitor shoeing sinus with bi geminal PVC's noted as skipped beats on 2 of the strips. 2 of the strips just showed sinus at 71-77 bpm.  Seeing GEM TRAMMELL today in clinic,

## 2017-10-11 ENCOUNTER — OFFICE VISIT (OUTPATIENT)
Dept: CARDIOLOGY | Facility: CLINIC | Age: 47
End: 2017-10-11
Attending: INTERNAL MEDICINE
Payer: COMMERCIAL

## 2017-10-11 VITALS
HEART RATE: 36 BPM | HEIGHT: 66 IN | BODY MASS INDEX: 24.08 KG/M2 | SYSTOLIC BLOOD PRESSURE: 103 MMHG | DIASTOLIC BLOOD PRESSURE: 51 MMHG | WEIGHT: 149.8 LBS

## 2017-10-11 DIAGNOSIS — I49.3 PVC'S (PREMATURE VENTRICULAR CONTRACTIONS): ICD-10-CM

## 2017-10-11 PROCEDURE — 93000 ELECTROCARDIOGRAM COMPLETE: CPT | Performed by: INTERNAL MEDICINE

## 2017-10-11 PROCEDURE — 99214 OFFICE O/P EST MOD 30 MIN: CPT | Performed by: INTERNAL MEDICINE

## 2017-10-11 NOTE — PROGRESS NOTES
HPI and Plan:   See dictation    Orders Placed This Encounter   Procedures     Follow-Up with Electrophysiologist     EKG 12-lead complete w/read - Clinics     Exercise Stress Echocardiogram       No orders of the defined types were placed in this encounter.      There are no discontinued medications.      Encounter Diagnosis   Name Primary?     PVC's (premature ventricular contractions)        CURRENT MEDICATIONS:  Current Outpatient Prescriptions   Medication Sig Dispense Refill     flecainide (TAMBOCOR) 100 MG tablet Take 100 mg by mouth 2 times daily       topiramate (TOPAMAX) 50 MG tablet Take 50 mg by mouth 2 times daily       cetirizine (ZYRTEC) 10 MG tablet Take 10 mg by mouth daily       Fluticasone Propionate (FLONASE NA)        Butalbital-Aspirin-Caffeine (FIORINAL PO)        Isometheptene-Dichloral-APAP (MIDRIN PO)        order for DME Equipment being ordered: Knee sleeve 1 Device 0       ALLERGIES     Allergies   Allergen Reactions     Adhesive Tape      Imitrex [Sumatriptan]      Metal [Staples]      Nsaids        PAST MEDICAL HISTORY:  Past Medical History:   Diagnosis Date     Fibromyalgia      SVT (supraventricular tachycardia) (H)        PAST SURGICAL HISTORY:  Past Surgical History:   Procedure Laterality Date     HYSTERECTOMY       ORTHOPEDIC SURGERY       TONSILLECTOMY         FAMILY HISTORY:  Family History   Problem Relation Age of Onset     Myocardial Infarction Mother      Thyroid Disease Mother      Bladder Cancer Father      Hypertension Father      Hypertension Sister      Hypertension Brother      HEART DISEASE Maternal Grandmother      Dementia Maternal Grandmother      Hypothyroidism Maternal Grandmother      HEART DISEASE Maternal Grandfather      DIABETES Maternal Grandfather      Hypothyroidism Paternal Grandmother      Hypertension Brother      DIABETES Brother      Arrhythmia Brother      Heart Surgery Brother      triple bipass     Hypertension Brother      Coronary Artery  Disease Paternal Grandfather        SOCIAL HISTORY:  Social History     Social History     Marital status: Single     Spouse name: N/A     Number of children: N/A     Years of education: N/A     Social History Main Topics     Smoking status: Current Every Day Smoker     Packs/day: 0.50     Smokeless tobacco: Never Used      Comment: 3-4 per day     Alcohol use No     Drug use: No     Sexual activity: Not Asked     Other Topics Concern     None     Social History Narrative       Review of Systems:  Skin:  Negative       Eyes:  Positive for glasses    ENT:  Positive for nasal congestion allergies  Respiratory:  Positive for shortness of breath feels unable to get a full breath   Cardiovascular:    Positive for;palpitations    Gastroenterology: Negative      Genitourinary:  Negative      Musculoskeletal:  Positive for fibromyalgia pain in both legs L<R, cramps in back  Neurologic:  Positive for   hx of migraines  Psychiatric:  Negative      Heme/Lymph/Imm:  Negative      Endocrine:  Negative        [id number]

## 2017-10-11 NOTE — MR AVS SNAPSHOT
After Visit Summary   10/11/2017    Cheryl Gill    MRN: [id number]           Patient Information     Date Of Birth          1970        Visit Information        Provider Department      10/11/2017 12:45 PM Carole Moore MD [facility]YS[facility] AT [place]        Today's Diagnoses     PVC's (premature ventricular contractions)           Follow-ups after your visit        Additional Services     Follow-Up with Electrophysiologist                 Your next 10 appointments already scheduled     Oct 16, 2017  1:00 PM CDT   Ech Stress Test with [id number]   [facility] CV Echocardiography (Cardiovascular Imaging at [facility])    [address] [id number] [phone]           1. Please bring or wear a comfortable two-piece outfit and walking shoes. 2. Stop eating 3 hours before the test. You may drink water or juice. 3. Stop all caffeine 12 hours before the test. This includes coffee, tea, soda pop, chocolate and certain medicines (such as Anacin and Excederin). Also avoid decaf coffee and tea, as these contain small amounts of caffeine. 4. No alcohol, smoking or use of other tobacco products for 12 hours before the test. 5. Refer to your provider instructions to see if you need to stop any medications (such as beta-blockers or nitrates) for this test. 6. For patients with diabetes: - If you take insulin, call your diabetes care team. Ask if you should take a   dose the morning of your test. - If you take diabetes medicine by mouth, dont take it on the morning of your test. Bring it with you to take after the test. (If you have questions, call your diabetes care team) 7. When you arrive, please tell us if: - You have diabetes. - You have taken Viagra, Cialis or Levitra in the past 48 hours. 8. For any questions that cannot be answered, please contact the ordering physician            Nov 27, 2017 11:15 AM CST    Return Visit with Abhi Vo MD   [facility] PHYSIC[facility] AT [place] ([facility])    [address]  [facility] [place] [facility] [phone]            Dec 18, 2017  3:15 PM CST   [facility] EP RETURN with Carole Moore MD   [facility] AT [place] ([facility])    [address]  [facility] [place] [phone] [phone]              Future tests that were ordered for you today     Open Future Orders        Priority Expected Expires Ordered    Follow-Up with Electrophysiologist Routine 12/13/2017 10/11/2019 10/11/2017    Exercise Stress Echocardiogram Routine 10/18/2017 10/11/2018 10/11/2017            Who to contact     If you have questions or need follow up information about today's clinic visit or your schedule please contact [facility] AT [place] directly at [phone].  Normal or non-critical lab and imaging results will be communicated to you by [employer], letter or phone within 4 business days after the clinic has received the results. If you do not hear from us within 7 days, please contact the clinic through bookjamt or phone. If you have a critical or abnormal lab result, we will notify you by phone as soon as possible.  Submit refill requests through Beep or call your pharmacy and they will forward the refill request to us. Please allow 3 business days for your refill to be completed.          Additional Information About Your Visit        [employer] Information     Beep gives you secure access to your electronic health record. If you see a primary care provider, you can also send messages to your care team and make appointments. If you have questions, please call your primary care clinic.  If you do not have a primary care provider, please call [phone] and they will assist you.        Care EveryWhere ID     This is your Care EveryWhere ID. This could be used by other  "organizations to access your [place] medical records  [id number]        Your Vitals Were     Pulse Height BMI (Body Mass Index)             36 1.676 m (5' 6\") 24.18 kg/m2          Blood Pressure from Last 3 Encounters:   10/11/17 103/51   09/18/17 110/60   09/14/17 107/66    Weight from Last 3 Encounters:   10/11/17 67.9 kg (149 lb 12.8 oz)   09/18/17 66.8 kg (147 lb 4.8 oz)   08/27/17 65.8 kg (145 lb)              We Performed the Following     EKG 12-lead complete w/read - Clinics     Follow-Up with Electrophysiologist        Primary Care Provider    None Specified       No primary provider on file.        Equal Access to Services     ALFONSO HOLLIS : Severiano Barney, aura esteban, bala kaalmagerber perry, rodrigo gibson . So [facility] [phone].    ATENCIÓN: Si habla español, tiene a herbert disposición servicios gratuitos de asistencia lingüística. Llame al [phone].    We comply with applicable federal civil rights laws and [place] laws. We do not discriminate on the basis of race, color, national origin, age, disability, sex, sexual orientation, or gender identity.            Thank you!     Thank you for choosing [facility] AT [place]  for your care. Our goal is always to provide you with excellent care. Hearing back from our patients is one way we can continue to improve our services. Please take a few minutes to complete the written survey that you may receive in the mail after your visit with us. Thank you!             Your Updated Medication List - Protect others around you: Learn how to safely use, store and throw away your medicines at www.disposemymeds.org.          This list is accurate as of: 10/11/17  1:23 PM.  Always use your most recent med list.                   Brand Name Dispense Instructions for use Diagnosis    cetirizine 10 MG tablet    zyrTEC     Take 10 mg by mouth daily        FIORINAL PO           flecainide 100 " MG tablet    TAMBOCOR     Take 100 mg by mouth 2 times daily        FLONASE NA           MIDRIN PO           order for DME     1 Device    Equipment being ordered: Knee sleeve    Sprain of medial collateral ligament of left knee, initial encounter       TOPAMAX 50 MG tablet   Generic drug:  topiramate      Take 50 mg by mouth 2 times daily

## 2017-10-11 NOTE — PROGRESS NOTES
HISTORY OF PRESENT ILLNESS:    It was my pleasure seeing Ms. Cheryl Gill for evaluation of symptomatic PVCs.  This very pleasant 46-year-old  at the [facility] has been referred by Dr. Vo.  The patient has history of symptomatic PVCs going back 8 years ago.  She was highly symptomatic at the time with constant heart skipping and feeling lightheaded.  She was seen by a [facility] cardiologist at [place].  She had a normal structural heart evaluation (as per patient's report).  After metoprolol caused severe bradycardia she was placed on flecainide.  On flecainide 100 mg b.i.d., she has had excellent suppression of her symptoms until about 2 months ago.      She then started developing symptoms similar to what she had initially, mainly a profound sensation of heart skipping with lightheadedness and feeling sweaty.  In fact, she ended up in the Emergency Department on 09/14/2017.  Her ECG showed sinus rhythm with ventricular bigeminy.      Subsequently, Cheryl saw Dr. Vo who referred her to EP.      The patient has fainted once as a child.  She has a family history of coronary artery disease including her mother (she has had 2 heart attacks, the initial one at age younger than 50) and both sets of grandparents.  She is a nonsmoker and drinks 3 caffeinated beverages per day.  Mild use of alcohol.      She is physically active without symptoms to suggest angina.      PHYSICAL EXAMINATION:   VITAL SIGNS:  Blood pressure 103/51, pulse 36 (recorded I presume during the time of PVCs), height 167 cm, weight 68 kg.   GENERAL:  She is a pleasant, healthy-appearing woman in no distress.   HEENT:  Normocephalic, atraumatic.  Sclerae anicteric.  Mouth, oropharynx clear.   NECK:  Supple, without thyromegaly or lymphadenopathy.  No carotid bruits.   LUNGS:  Clear.  No crackles or wheezes.   CARDIOVASCULAR:  Normal JVP, regular rhythm with occasional ectopic beats.  No gallop, murmur or rub.  "  ABDOMEN:  Soft, nontender.  Negative HJR.   EXTREMITIES:  No edema.   SKIN:  No rash.   BACK:  No CVA tenderness.   NEUROLOGIC:  Alert and oriented x3.      DIAGNOSTIC STUDIES:    Recent laboratory tests:  Sodium 138, potassium 3.5, creatinine 0.79.  TSH 3.25, platelets 182,000, hematocrit 46%.      Her 12-lead ECG today showed sinus rhythm within normal limits.      Her 12-lead ECG on 09/14/2017 showed sinus rhythm with bigeminal PVCs exhibiting a right bundle branch block morphology with indeterminate axis.      IMPRESSION:   1.  Symptomatic PVCs.  Ms. Gill is a 46-year-old woman with long history of symptomatic PVCs, presumably idiopathic (per her report, no significant cardiac disease was found during investigation at [place]).  She had good symptom relief with flecainide for years.  Recently, however, she has had significant symptoms despite flecainide.  A Holter monitor in September showed a PVC burden of 2%.        We had a good discussion about the pathophysiology of her PVCs.  Her PVC morphology is somewhat unusual and is not one of the 2-3 most common idiopathic PVC morphologies.  It does suggest origin in the left ventricle.  The PVCs are very wide, possibly epicardial.        Given the unusual PVC appearance, it is reasonable to repeat a structural cardiac evaluation at this time.  I suggested a stress echocardiogram.         Since Ms. Gill is having symptoms despite flecainide, a reasonable next option is PVC ablation.  Before committing her to an invasive procedure in the LV, I would like to see how she does without any medication changes for a couple of months.  It is common for PVCs to go through \"peaks and valleys\" with regard to their activity.  Quite commonly PVC volume and symptoms improve without intervention.  If she continues to have significant symptoms, I would further discuss catheter ablation of PVCs when I see her again.      RECOMMENDATIONS:   A.  Stress echocardiogram.   B.  Follow " up with EP in 2 months.      I do appreciate the opportunity to be part of her care.         MASOUD VASQUEZ MD             D: 10/11/2017 13:30   T: 10/11/2017 14:54   MT: EDILIA      Name:     ALEX HERNANDEZ   MRN:      [id number]        Account:      [id number]   :      1970           Service Date: 10/11/2017      Document: [id number]

## 2017-10-13 ENCOUNTER — DOCUMENTATION ONLY (OUTPATIENT)
Dept: CARDIOLOGY | Facility: CLINIC | Age: 47
End: 2017-10-13

## 2017-10-13 NOTE — PROGRESS NOTES
Cardionet symptomatic episode from 10/11 at 17:59 showing SR at 80- bpm with bigeminy ( wide complex). Saw Dr. Moore on 10/11 and recommended coming off flecainide and doing a stress echo. Will follow up with EP in two months. Raghu

## 2017-10-16 ENCOUNTER — HOSPITAL ENCOUNTER (OUTPATIENT)
Dept: CARDIOLOGY | Facility: CLINIC | Age: 47
Discharge: HOME | End: 2017-10-16
Attending: INTERNAL MEDICINE | Admitting: INTERNAL MEDICINE
Payer: COMMERCIAL

## 2017-10-16 DIAGNOSIS — I49.3 PVC'S (PREMATURE VENTRICULAR CONTRACTIONS): ICD-10-CM

## 2017-10-16 PROCEDURE — 93016 CV STRESS TEST SUPVJ ONLY: CPT | Performed by: INTERNAL MEDICINE

## 2017-10-16 PROCEDURE — 93321 DOPPLER ECHO F-UP/LMTD STD: CPT | Mod: 26 | Performed by: INTERNAL MEDICINE

## 2017-10-16 PROCEDURE — 93018 CV STRESS TEST I&R ONLY: CPT | Performed by: INTERNAL MEDICINE

## 2017-10-16 PROCEDURE — 93325 DOPPLER ECHO COLOR FLOW MAPG: CPT | Mod: 26 | Performed by: INTERNAL MEDICINE

## 2017-10-16 PROCEDURE — 25500064 ZZH RX 255 OP 636: Performed by: INTERNAL MEDICINE

## 2017-10-16 PROCEDURE — 93350 STRESS TTE ONLY: CPT | Mod: 26 | Performed by: INTERNAL MEDICINE

## 2017-10-16 PROCEDURE — 40000264 ECHO STRESS WITH OPTISON

## 2017-10-16 RX ADMIN — HUMAN ALBUMIN MICROSPHERES AND PERFLUTREN 6 ML: 10; .22 INJECTION, SOLUTION INTRAVENOUS at 13:45

## 2017-10-16 NOTE — PROGRESS NOTES
Rhythm strip from 10/12 showing sinus at 82 bpm.   On 10/13/17, rhythm strip from 5:21 PM showing sinus with 3 beat run of slow ventricular rhythm. Will continue to observe. This was an automatic event strip.

## 2017-10-18 ENCOUNTER — TELEPHONE (OUTPATIENT)
Dept: CARDIOLOGY | Facility: CLINIC | Age: 47
End: 2017-10-18

## 2017-10-18 NOTE — TELEPHONE ENCOUNTER
Notes Recorded by Carole Moore MD on 10/17/2017 at 7:09 PM  Not the best image quality, but the test appeared normal.  Please call Ms. W and let her know.     Called and informed pt of test results after reviewed by Dr Moore.

## 2017-10-23 NOTE — PROGRESS NOTES
Rhythm strip from 10/20/17 showing sinus rhythm at 78 bpm. Monitoring period to continue until 10/28/17 per report. Will send strip to file and continue to monitor. JOSH Thapa RN

## 2017-10-26 ENCOUNTER — MYC MEDICAL ADVICE (OUTPATIENT)
Dept: CARDIOLOGY | Facility: CLINIC | Age: 47
End: 2017-10-26

## 2017-10-26 DIAGNOSIS — I49.3 PVC'S (PREMATURE VENTRICULAR CONTRACTIONS): Primary | ICD-10-CM

## 2017-10-26 RX ORDER — FLECAINIDE ACETATE 100 MG/1
100 TABLET ORAL 2 TIMES DAILY
Qty: 180 TABLET | Refills: 3 | Status: SHIPPED | OUTPATIENT
Start: 2017-10-26 | End: 2018-12-17

## 2017-10-26 NOTE — TELEPHONE ENCOUNTER
Pt sent [facility] message requesting refill on flecainide.     Last OV with Dr. Moore: 10/11/2017  Last 12 lead EKG: 10/11/2017    Sent message back to pt asking which pharmacy she'd like the refill sent to.     ADDENDUM: pt sent message back specifying pharmacy. Refill sent. Pt notified.

## 2017-11-06 ENCOUNTER — TELEPHONE (OUTPATIENT)
Dept: CARDIOLOGY | Facility: CLINIC | Age: 47
End: 2017-11-06

## 2017-11-06 NOTE — TELEPHONE ENCOUNTER
Received message from patient calling back regarding upcoming appointment with Dr. Vo scheduled for 11/27/17. Returned patient's call and left message to inform her that this appointment is no longer needed so long as she is doing well as she is scheduled for a follow up visit Dr. Moore on 12/18/17. She called back and stated that this will be fine for her to cancel the 11/27 visit and plan for follow up on 12/18/17. The appointment on 11/27/17 was cancelled. JOSH Thapa RN

## 2017-12-18 ENCOUNTER — OFFICE VISIT (OUTPATIENT)
Dept: CARDIOLOGY | Facility: CLINIC | Age: 47
End: 2017-12-18
Attending: INTERNAL MEDICINE
Payer: COMMERCIAL

## 2017-12-18 VITALS
BODY MASS INDEX: 24.43 KG/M2 | HEIGHT: 66 IN | SYSTOLIC BLOOD PRESSURE: 100 MMHG | WEIGHT: 152 LBS | DIASTOLIC BLOOD PRESSURE: 70 MMHG | HEART RATE: 60 BPM

## 2017-12-18 DIAGNOSIS — I49.3 PVC'S (PREMATURE VENTRICULAR CONTRACTIONS): ICD-10-CM

## 2017-12-18 PROCEDURE — 99213 OFFICE O/P EST LOW 20 MIN: CPT | Performed by: INTERNAL MEDICINE

## 2017-12-18 NOTE — PROGRESS NOTES
HPI and Plan:   See dictation    No orders of the defined types were placed in this encounter.      No orders of the defined types were placed in this encounter.      There are no discontinued medications.      Encounter Diagnosis   Name Primary?     PVC's (premature ventricular contractions)        CURRENT MEDICATIONS:  Current Outpatient Prescriptions   Medication Sig Dispense Refill     flecainide (TAMBOCOR) 100 MG tablet Take 1 tablet (100 mg) by mouth 2 times daily 180 tablet 3     topiramate (TOPAMAX) 50 MG tablet Take 50 mg by mouth 2 times daily       cetirizine (ZYRTEC) 10 MG tablet Take 10 mg by mouth daily       Fluticasone Propionate (FLONASE NA)        Butalbital-Aspirin-Caffeine (FIORINAL PO)        Isometheptene-Dichloral-APAP (MIDRIN PO)        order for DME Equipment being ordered: Knee sleeve 1 Device 0       ALLERGIES     Allergies   Allergen Reactions     Adhesive Tape      Imitrex [Sumatriptan]      Metal [Staples]      Nsaids        PAST MEDICAL HISTORY:  Past Medical History:   Diagnosis Date     Fibromyalgia      SVT (supraventricular tachycardia) (H)        PAST SURGICAL HISTORY:  Past Surgical History:   Procedure Laterality Date     HYSTERECTOMY       ORTHOPEDIC SURGERY       TONSILLECTOMY         FAMILY HISTORY:  Family History   Problem Relation Age of Onset     Myocardial Infarction Mother      Thyroid Disease Mother      Bladder Cancer Father      Hypertension Father      Hypertension Sister      Hypertension Brother      HEART DISEASE Maternal Grandmother      Dementia Maternal Grandmother      Hypothyroidism Maternal Grandmother      HEART DISEASE Maternal Grandfather      DIABETES Maternal Grandfather      Hypothyroidism Paternal Grandmother      Hypertension Brother      DIABETES Brother      Arrhythmia Brother      Heart Surgery Brother      triple bipass     Hypertension Brother      Coronary Artery Disease Paternal Grandfather        SOCIAL HISTORY:  Social History     Social  History     Marital status: Single     Spouse name: N/A     Number of children: N/A     Years of education: N/A     Social History Main Topics     Smoking status: Current Every Day Smoker     Packs/day: 0.50     Smokeless tobacco: Never Used      Comment: 3-4 per day     Alcohol use No     Drug use: No     Sexual activity: Not Asked     Other Topics Concern     None     Social History Narrative       Review of Systems:  Skin:  Negative       Eyes:  Positive for glasses    ENT:  Positive for nasal congestion allergies  Respiratory:  Positive for shortness of breath feels unable to get a full breath   Cardiovascular:    Positive for;palpitations    Gastroenterology: Negative      Genitourinary:  Negative      Musculoskeletal:  Positive for fibromyalgia pain in both legs L<R, cramps in back  Neurologic:  Positive for   hx of migraines  Psychiatric:  Negative      Heme/Lymph/Imm:  Negative      Endocrine:  Negative        dictated

## 2017-12-18 NOTE — MR AVS SNAPSHOT
"              After Visit Summary   12/18/2017    Cheryl Gill    MRN: [id number]           Patient Information     Date Of Birth          1970        Visit Information        Provider Department      12/18/2017 3:15 PM Carole Moore MD [facility]        Today's Diagnoses     PVC's (premature ventricular contractions)           Follow-ups after your visit        Who to contact     If you have questions or need follow up information about today's clinic visit or your schedule please contact [facility] directly at [phone].  Normal or non-critical lab and imaging results will be communicated to you by [employer], letter or phone within 4 business days after the clinic has received the results. If you do not hear from us within 7 days, please contact the clinic through Aloqat or phone. If you have a critical or abnormal lab result, we will notify you by phone as soon as possible.  Submit refill requests through WellRight or call your pharmacy and they will forward the refill request to us. Please allow 3 business days for your refill to be completed.          Additional Information About Your Visit        MyChart Information     WellRight gives you secure access to your electronic health record. If you see a primary care provider, you can also send messages to your care team and make appointments. If you have questions, please call your primary care clinic.  If you do not have a primary care provider, please call [phone] and they will assist you.        Care EveryWhere ID     This is your Care EveryWhere ID. This could be used by other organizations to access your [place] medical records  [id number]        Your Vitals Were     Pulse Height BMI (Body Mass Index)             60 1.676 m (5' 6\") 24.53 kg/m2          Blood Pressure from Last 3 Encounters:   12/18/17 100/70   10/11/17 103/51   09/18/17 110/60    Weight " from Last 3 Encounters:   12/18/17 68.9 kg (152 lb)   10/11/17 67.9 kg (149 lb 12.8 oz)   09/18/17 66.8 kg (147 lb 4.8 oz)              We Performed the Following     Follow-Up with Electrophysiologist        Primary Care Provider Fax #    Physician No Ref-Primary [phone]       No address on file        Equal Access to Services     [facility]: Hadii aad ku hadasho Soomaali, waaxda luqadaha, qaybta kaalmada adeegyada, waxay idiin hayaan adeeg billie laParulaan . So [facility] [phone].    ATENCIÓN: Si habla español, tiene a herbert disposición servicios gratuitos de asistencia lingüística. Llame al [phone].    We comply with applicable federal civil rights laws and [place] laws. We do not discriminate on the basis of race, color, national origin, age, disability, sex, sexual orientation, or gender identity.            Thank you!     Thank you for choosing [facility]  for your care. Our goal is always to provide you with excellent care. Hearing back from our patients is one way we can continue to improve our services. Please take a few minutes to complete the written survey that you may receive in the mail after your visit with us. Thank you!             Your Updated Medication List - Protect others around you: Learn how to safely use, store and throw away your medicines at www.disposemymeds.org.          This list is accurate as of: 12/18/17  4:13 PM.  Always use your most recent med list.                   Brand Name Dispense Instructions for use Diagnosis    cetirizine 10 MG tablet    zyrTEC     Take 10 mg by mouth daily        FIORINAL PO           flecainide 100 MG tablet    TAMBOCOR    180 tablet    Take 1 tablet (100 mg) by mouth 2 times daily    PVC's (premature ventricular contractions)       FLONASE NA           MIDRIN PO           order for DME     1 Device    Equipment being ordered: Knee sleeve    Sprain of medial collateral ligament of left knee, initial  encounter       TOPAMAX 50 MG tablet   Generic drug:  topiramate      Take 50 mg by mouth 2 times daily

## 2017-12-18 NOTE — PROGRESS NOTES
HISTORY OF PRESENT ILLNESS:    I had the pleasure of seeing Ms. Cheryl Gill in follow-up of symptomatic PVCs.  For details of her history, please refer to my note from 10/11/2017.  Cheryl is a very pleasant 46-year-old woman who has been on flecainide 100 mg b.i.d. for suppression of symptomatic PVCs.  Prior to our visit in October, she had experienced a 2-month period with increased symptoms despite taking flecainide which was unusual for her.      When I saw her, we discussed that her PVCs appear atypical, RBBB morphology, not arising from one of the common anatomic areas for idiopathic PVCs.  I wanted to see how she does for a couple of months without additional therapy before committing her to more aggressive interventions.      In coming in today, Ms. Gill said that her PVCs have calmed down and are now back to where they were at baseline.  She did report, however, of occasional lightheadedness when she gets up quickly.  This is a new symptom for her.      PHYSICAL EXAMINATION:   VITAL SIGNS:  Blood pressure 100/70 sitting and 90/70 within 3 min after standing, pulse 60 and regular, weight 69 kg, height 167 cm.   NECK:  Supple without bruits.   LUNGS:  Clear.   CARDIOVASCULAR:  Regular rhythm without ectopic beats.   EXTREMITIES:  No edema.      DIAGNOSTIC STUDIES:    Her stress echocardiogram in 10/2017 was not technically the best study.  There were no apparent abnormalities.      IMPRESSION & PLAN:   1.  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  After a period of increased PVC activity (despite taking flecainide 100 mg b.i.d.) in the fall, the patient's PVCs have become more quiescent again for unclear reasons.  Therefore, there is no immediate reason for intervention.     Her dizziness upon standing is explained by her low normal blood pressure.      I reassured Ms. Gill and encouraged her to call our office if her symptoms worsen again.      Thank you for the opportunity to be part of  her care.         MASOUD VASQUEZ MD, [facility]             D: 2017 15:32   T: 2017 16:21   MT: EDILIA      Name:     ALEX HERNANDEZ   MRN:      [id number]        Account:      [id number]   :      1970           Service Date: 2017      Document: [id number]

## 2018-01-05 ENCOUNTER — HOSPITAL ENCOUNTER (OUTPATIENT)
Dept: MAMMOGRAPHY | Facility: CLINIC | Age: 48
End: 2018-01-05
Attending: NURSE PRACTITIONER
Payer: COMMERCIAL

## 2018-01-05 ENCOUNTER — HOSPITAL ENCOUNTER (OUTPATIENT)
Dept: MAMMOGRAPHY | Facility: CLINIC | Age: 48
Discharge: HOME | End: 2018-01-05
Attending: NURSE PRACTITIONER | Admitting: NURSE PRACTITIONER
Payer: COMMERCIAL

## 2018-01-05 ENCOUNTER — OFFICE VISIT (OUTPATIENT)
Dept: FAMILY MEDICINE | Facility: CLINIC | Age: 48
End: 2018-01-05
Payer: COMMERCIAL

## 2018-01-05 VITALS
TEMPERATURE: 97.6 F | DIASTOLIC BLOOD PRESSURE: 74 MMHG | HEIGHT: 66 IN | OXYGEN SATURATION: 100 % | BODY MASS INDEX: 24.83 KG/M2 | SYSTOLIC BLOOD PRESSURE: 104 MMHG | HEART RATE: 31 BPM | WEIGHT: 154.5 LBS

## 2018-01-05 DIAGNOSIS — N63.0 LUMP OR MASS IN BREAST: ICD-10-CM

## 2018-01-05 DIAGNOSIS — N63.0 LUMP OR MASS IN BREAST: Primary | ICD-10-CM

## 2018-01-05 DIAGNOSIS — F17.210 CIGARETTE NICOTINE DEPENDENCE WITHOUT COMPLICATION: ICD-10-CM

## 2018-01-05 PROCEDURE — 76642 ULTRASOUND BREAST LIMITED: CPT | Mod: LT

## 2018-01-05 PROCEDURE — 77066 DX MAMMO INCL CAD BI: CPT

## 2018-01-05 PROCEDURE — 99213 OFFICE O/P EST LOW 20 MIN: CPT | Performed by: NURSE PRACTITIONER

## 2018-01-05 NOTE — PROGRESS NOTES
SUBJECTIVE:   Cheryl Gill is a 47 year old female who presents to clinic today for the following health issues:      Breast lump, left, noticed it on Sat., tender to touch    Unknown LMP - had hysterectomy and does still have ovaries  Drinks coffee  No family history of breast disease  No personal history of breast masses or pain   No previous mammogram  Does not do routine SBE  No nipple discharge or skin changes      Problem list and histories reviewed & adjusted, as indicated.  Additional history: as documented    Patient Active Problem List   Diagnosis     SVT (supraventricular tachycardia) (H)     Fibromyalgia     Nicotine dependence     PVC's (premature ventricular contractions)     Past Surgical History:   Procedure Laterality Date     HYSTERECTOMY       ORTHOPEDIC SURGERY       TONSILLECTOMY         Social History   Substance Use Topics     Smoking status: Current Every Day Smoker     Packs/day: 0.50     Smokeless tobacco: Never Used      Comment: 3-4 per day     Alcohol use No     Family History   Problem Relation Age of Onset     Myocardial Infarction Mother      Thyroid Disease Mother      Bladder Cancer Father      Hypertension Father      Hypertension Sister      Hypertension Brother      HEART DISEASE Maternal Grandmother      Dementia Maternal Grandmother      Hypothyroidism Maternal Grandmother      HEART DISEASE Maternal Grandfather      DIABETES Maternal Grandfather      Hypothyroidism Paternal Grandmother      Hypertension Brother      DIABETES Brother      Arrhythmia Brother      Heart Surgery Brother      triple bipass     Hypertension Brother      Coronary Artery Disease Paternal Grandfather          Current Outpatient Prescriptions   Medication Sig Dispense Refill     flecainide (TAMBOCOR) 100 MG tablet Take 1 tablet (100 mg) by mouth 2 times daily 180 tablet 3     topiramate (TOPAMAX) 50 MG tablet Take 50 mg by mouth 2 times daily       cetirizine (ZYRTEC) 10 MG tablet Take 10 mg by  "mouth daily       Fluticasone Propionate (FLONASE NA)        Butalbital-Aspirin-Caffeine (FIORINAL PO)        Isometheptene-Dichloral-APAP (MIDRIN PO)        order for DME Equipment being ordered: Knee sleeve 1 Device 0     Allergies   Allergen Reactions     Adhesive Tape      Imitrex [Sumatriptan]      Metal [Staples]      Nsaids          Reviewed and updated as needed this visit by clinical staff       Reviewed and updated as needed this visit by Provider         ROS:  Constitutional, HEENT, cardiovascular, pulmonary, gi and gu systems are negative, except as otherwise noted.      OBJECTIVE:   /74 (BP Location: Left arm, Patient Position: Chair, Cuff Size: Adult Regular)  Pulse (!) 31  Temp 97.6  F (36.4  C) (Oral)  Ht 5' 6\" (1.676 m)  Wt 154 lb 8 oz (70.1 kg)  SpO2 100%  Breastfeeding? No  BMI 24.94 kg/m2  Body mass index is 24.94 kg/(m^2).  GENERAL: healthy, alert and no distress  NECK: no adenopathy, no asymmetry, masses, or scars and thyroid normal to palpation  BREAST: dime sized nontender mobile mass of left breast 2-3 oclock mid zone , no nipple discharge and no palpable axillary masses or adenopathy  MS: no gross musculoskeletal defects noted, no edema  LYMPH: no cervical, supraclavicular, axillary, adenopathy    Diagnostic Test Results:Findings: No concerning mammographic findings.     Focussed ultrasound by radiologist and technologist of the upper outer  quadrant of the left breast was performed. Benign cysts are seen. No  concerning findings identified.       ASSESSMENT/PLAN:       ICD-10-CM    1. Lump or mass in breast N63.0 CANCELED: MA Diagnostic Digital Bilateral   2. Cigarette nicotine dependence without complication F17.210          Renetta Grant, TOMMY [facility]  "

## 2018-01-05 NOTE — MR AVS SNAPSHOT
After Visit Summary   1/5/2018    Cheryl Gill    MRN: [id number]           Patient Information     Date Of Birth          1970        Visit Information        Provider Department      1/5/2018 12:30 PM Renetta Grant APRN CNP [facility] [place]        Today's Diagnoses     Lump or mass in breast    -  1    Cigarette nicotine dependence without complication           Follow-ups after your visit        Follow-up notes from your care team     Return if symptoms worsen or fail to improve.      Future tests that were ordered for you today     Open Future Orders        Priority Expected Expires Ordered    US Breast Left Routine  1/5/2019 1/5/2018    MA Diagnostic Bilateral w/Chip Routine  1/5/2019 1/5/2018            Who to contact     If you have questions or need follow up information about today's clinic visit or your schedule please contact [facility] directly at [phone].  Normal or non-critical lab and imaging results will be communicated to you by MyChart, letter or phone within 4 business days after the clinic has received the results. If you do not hear from us within 7 days, please contact the clinic through [employer] or phone. If you have a critical or abnormal lab result, we will notify you by phone as soon as possible.  Submit refill requests through Elysia or call your pharmacy and they will forward the refill request to us. Please allow 3 business days for your refill to be completed.          Additional Information About Your Visit        MyChart Information     E[employer]ia gives you secure access to your electronic health record. If you see a primary care provider, you can also send messages to your care team and make appointments. If you have questions, please call your primary care clinic.  If you do not have a primary care provider, please call [phone] and they will assist you.        Care EveryWhere ID     This is your Care EveryWhere ID. This could be  "used by other organizations to access your [place] medical records  [id number]        Your Vitals Were     Pulse Temperature Height Pulse Oximetry Breastfeeding? BMI (Body Mass Index)    31 97.6  F (36.4  C) (Oral) 5' 6\" (1.676 m) 100% No 24.94 kg/m2       Blood Pressure from Last 3 Encounters:   01/05/18 104/74   12/18/17 100/70   10/11/17 103/51    Weight from Last 3 Encounters:   01/05/18 154 lb 8 oz (70.1 kg)   12/18/17 152 lb (68.9 kg)   10/11/17 149 lb 12.8 oz (67.9 kg)              Today, you had the following     No orders found for display       Primary Care Provider Fax #    Physician No Ref-Primary [phone]       No address on file        Equal Access to Services     [facility] : Severiano Barney, warudy esteban, bala kaalcarlos perry, rodrigo gibson . So [facility] [phone].    ATENCIÓN: Si habla español, tiene a herbert disposición servicios gratuitos de asistencia lingüística. Ronaldoame al [phone].    We comply with applicable federal civil rights laws and [place] laws. We do not discriminate on the basis of race, color, national origin, age, disability, sex, sexual orientation, or gender identity.            Thank you!     Thank you for choosing [facility]  for your care. Our goal is always to provide you with excellent care. Hearing back from our patients is one way we can continue to improve our services. Please take a few minutes to complete the written survey that you may receive in the mail after your visit with us. Thank you!             Your Updated Medication List - Protect others around you: Learn how to safely use, store and throw away your medicines at www.disposemymeds.org.          This list is accurate as of: 1/5/18  5:47 PM.  Always use your most recent med list.                   Brand Name Dispense Instructions for use Diagnosis    cetirizine 10 MG tablet    zyrTEC     Take 10 mg by mouth daily        FIORINAL PO           " flecainide 100 MG tablet    TAMBOCOR    180 tablet    Take 1 tablet (100 mg) by mouth 2 times daily    PVC's (premature ventricular contractions)       FLONASE NA           MIDRIN PO           order for DME     1 Device    Equipment being ordered: Knee sleeve    Sprain of medial collateral ligament of left knee, initial encounter       TOPAMAX 50 MG tablet   Generic drug:  topiramate      Take 50 mg by mouth 2 times daily

## 2018-01-05 NOTE — NURSING NOTE
"Chief Complaint   Patient presents with     Mass       Initial /74 (BP Location: Left arm, Patient Position: Chair, Cuff Size: Adult Regular)  Pulse (!) 31  Temp 97.6  F (36.4  C) (Oral)  Ht 5' 6\" (1.676 m)  Wt 154 lb 8 oz (70.1 kg)  SpO2 100%  Breastfeeding? No  BMI 24.94 kg/m2 Estimated body mass index is 24.94 kg/(m^2) as calculated from the following:    Height as of this encounter: 5' 6\" (1.676 m).    Weight as of this encounter: 154 lb 8 oz (70.1 kg).  Medication Reconciliation: complete.  Cherie Ramachandran CMA    "

## 2018-01-05 NOTE — LETTER
[facility]  [address]  [place] [address]                                                                                                              Cheryl Bridget [address]  [place] [address]    January 5, 2018  Date of Exam:     Dear Cheryl:    Thank you for your recent visit.  Breast Imaging Result: We are pleased to inform you that the results of your recent breast imaging show no evidence of malignancy (cancer).    Breast Density: Your mammogram shows that you have dense breast tissue. This means you have a slightly higher risk of getting breast cancer. It also means your mammograms will be harder to read but it doesn't mean that mammograms aren t useful. In fact, yearly mammograms are even more important for women at higher risk.    If you are experiencing any breast problems such as a lump or localized pain we request that you discuss this with your health care provider if you haven t already done so, as additional testing may be necessary.    As you know, early detection of cancer is very important. Although mammography is the most accurate method for early detection, not all cancers are found through mammography. A thorough examination includes a combination of mammography, physical examination and breast self-examination. Currently the American College of Radiology and the Society of Breast Imaging recommend an annual mammogram for all women beginning at the age of 40.    A report of your breast imaging results was sent to: Renetta Grant    Your breast imaging will become part of your medical file here at [place] for at least 10 years. You are responsible for informing any new health care provider or breast imaging facility of the date and location of this examination.    We appreciate the opportunity to participate in your health care.    Sincerely,    Quique Sanderson MD  Interpreting Radiologist  [facility]

## 2018-01-21 ENCOUNTER — HEALTH MAINTENANCE LETTER (OUTPATIENT)
Age: 48
End: 2018-01-21

## 2018-01-28 ENCOUNTER — TRANSFERRED RECORDS (OUTPATIENT)
Dept: HEALTH INFORMATION MANAGEMENT | Facility: CLINIC | Age: 48
End: 2018-01-28

## 2018-12-17 DIAGNOSIS — I49.3 PVC'S (PREMATURE VENTRICULAR CONTRACTIONS): ICD-10-CM

## 2018-12-17 RX ORDER — FLECAINIDE ACETATE 100 MG/1
100 TABLET ORAL 2 TIMES DAILY
Qty: 180 TABLET | Refills: 0 | Status: SHIPPED | OUTPATIENT
Start: 2018-12-17 | End: 2019-06-26

## 2019-06-26 DIAGNOSIS — I49.3 PVC'S (PREMATURE VENTRICULAR CONTRACTIONS): Primary | ICD-10-CM

## 2019-06-26 DIAGNOSIS — I49.3 PVC'S (PREMATURE VENTRICULAR CONTRACTIONS): ICD-10-CM

## 2019-06-26 RX ORDER — FLECAINIDE ACETATE 100 MG/1
100 TABLET ORAL 2 TIMES DAILY
Qty: 60 TABLET | Refills: 1 | Status: SHIPPED | OUTPATIENT
Start: 2019-06-26 | End: 2024-01-15

## 2019-06-26 NOTE — TELEPHONE ENCOUNTER
Refill request for Flecainide 100mg bid/ last OV with Dr Moore was .  Refill x1 month processed; message left on pts AM to call scheduling to arrange for a follow up with Dr Moore.  ECG needed at this visit. SueLangenbrunnerRN

## 2020-03-11 ENCOUNTER — HEALTH MAINTENANCE LETTER (OUTPATIENT)
Age: 50
End: 2020-03-11

## 2020-12-27 ENCOUNTER — HEALTH MAINTENANCE LETTER (OUTPATIENT)
Age: 50
End: 2020-12-27

## 2021-03-06 ENCOUNTER — HEALTH MAINTENANCE LETTER (OUTPATIENT)
Age: 51
End: 2021-03-06

## 2021-04-25 ENCOUNTER — HEALTH MAINTENANCE LETTER (OUTPATIENT)
Age: 51
End: 2021-04-25

## 2021-10-09 ENCOUNTER — HEALTH MAINTENANCE LETTER (OUTPATIENT)
Age: 51
End: 2021-10-09

## 2021-10-10 LAB — HIV 1&2 EXT: NORMAL

## 2022-03-26 ENCOUNTER — HEALTH MAINTENANCE LETTER (OUTPATIENT)
Age: 52
End: 2022-03-26

## 2022-05-21 ENCOUNTER — HEALTH MAINTENANCE LETTER (OUTPATIENT)
Age: 52
End: 2022-05-21

## 2022-09-17 ENCOUNTER — HEALTH MAINTENANCE LETTER (OUTPATIENT)
Age: 52
End: 2022-09-17

## 2023-04-12 LAB — INR (EXTERNAL): 0.9 (ref 0.9–1.1)

## 2023-05-06 ENCOUNTER — HEALTH MAINTENANCE LETTER (OUTPATIENT)
Age: 53
End: 2023-05-06

## 2023-06-04 ENCOUNTER — HEALTH MAINTENANCE LETTER (OUTPATIENT)
Age: 53
End: 2023-06-04

## 2023-07-15 LAB
CHOLESTEROL (EXTERNAL): 145 MG/DL (ref 0–199)
GLUCOSE (EXTERNAL): 126 MG/DL (ref 70–100)
HDLC SERPL-MCNC: 45 MG/DL
HEP C HIM: NORMAL
LDL CHOLESTEROL CALCULATED (EXTERNAL): 81 MG/DL
NON HDL CHOLESTEROL (EXTERNAL): 100 MG/DL
POTASSIUM (EXTERNAL): 4.3 MMOL/L (ref 3.5–5.1)
TRIGLYCERIDES (EXTERNAL): 95 MG/DL

## 2023-09-01 LAB
ALT SERPL-CCNC: 38 U/L
AST SERPL-CCNC: 22 U/L (ref 10–40)
CREATININE (EXTERNAL): 0.8 MG/DL (ref 0.6–1)
GFR ESTIMATED (EXTERNAL): >60 ML/MIN/1.73M2

## 2024-01-15 ENCOUNTER — VIRTUAL VISIT (OUTPATIENT)
Dept: FAMILY MEDICINE | Facility: CLINIC | Age: 54
End: 2024-01-15
Payer: COMMERCIAL

## 2024-01-15 ENCOUNTER — DOCUMENTATION ONLY (OUTPATIENT)
Dept: FAMILY MEDICINE | Facility: CLINIC | Age: 54
End: 2024-01-15

## 2024-01-15 DIAGNOSIS — E66.811 OBESITY (BMI 30.0-34.9): ICD-10-CM

## 2024-01-15 DIAGNOSIS — L65.9 HAIR LOSS: ICD-10-CM

## 2024-01-15 DIAGNOSIS — R73.03 PREDIABETES: Primary | ICD-10-CM

## 2024-01-15 DIAGNOSIS — Z13.29 SCREENING FOR ENDOCRINE DISORDER: ICD-10-CM

## 2024-01-15 PROCEDURE — 99204 OFFICE O/P NEW MOD 45 MIN: CPT | Mod: 95 | Performed by: INTERNAL MEDICINE

## 2024-01-15 RX ORDER — TOPIRAMATE 100 MG/1
TABLET, FILM COATED ORAL
COMMUNITY

## 2024-01-15 RX ORDER — METFORMIN HCL 500 MG
500 TABLET, EXTENDED RELEASE 24 HR ORAL
Qty: 90 TABLET | Refills: 3 | Status: SHIPPED | OUTPATIENT
Start: 2024-01-15

## 2024-01-15 NOTE — PROGRESS NOTES
Patient is requesting lab test orders to be faxed to [facility] at [phone].    Lab orders printed and placed in TC basket for faxing.

## 2024-01-15 NOTE — PROGRESS NOTES
Cheryl is a 53 year old female with cardiomyopathy, fibromyalgia and labral tear who is being evaluated via a billable video visit.      How would you like to obtain your AVS? Rollyharlizet  If the video visit is dropped, the invitation should be resent by: Send to e-mail at: [email or web address]  Will anyone else be joining your video visit? No     [facility] Internal Medicine Progress Note           Assessment and Plan:     Prediabetes  - REVIEW OF HEALTH MAINTENANCE PROTOCOL ORDERS  - Hemoglobin A1c; Standing  - metFORMIN (GLUCOPHAGE XR) 500 MG 24 hr tablet; Take 1 tablet (500 mg) by mouth daily (with dinner)    Obesity (BMI 30.0-34.9)  - REVIEW OF HEALTH MAINTENANCE PROTOCOL ORDERS  - topiramate (TOPAMAX) 100 MG tablet  - Semaglutide-Weight Management (WEGOVY) 0.25 MG/0.5ML pen; Inject 0.25 mg Subcutaneous once a week    Hair loss  - Ferritin; Standing  - Iron and iron binding capacity; Standing    Screening for endocrine disorder  - Cortisol; Standing  - DHEA sulfate; Standing  - Follicle stimulating hormone; Standing  - TSH; Standing  - T4, free; Standing  - Thyroid peroxidase antibody; Standing          Interval History:     Prediabetes Follow-up    How often are you checking your blood sugar? Whenever Patient feels funny  What time of day are you checking your blood sugars (select all that apply)?  Any time when Patient feels dizzy, hot   Have you had any blood sugars above 200?  Yes   Have you had any blood sugars below 70?  Yes   What symptoms do you notice when your blood sugar is low?  None- when Patient's blood sugar is High-  shaky, hot, dizzy, sometimes is 280  What concerns do you have today about your diabetes? Other: Blood Sugar is all over the board no matter what Patient does.   Do you have any of these symptoms? (Select all that apply)  Excessive thirst and Weight gain  Drinks 2 Water bottles during the night.    BP Readings from Last 2 Encounters:   01/05/18 104/74   12/18/17 100/70        -Feels hot when glucose is high. She tracked her food intake yet glucose fluctuates.  -High Cortisol and low DHEA.  -Gained weight back (after losing 85 lbs) associated with hair loss.              Significant Problems:     Patient Active Problem List   Diagnosis    SVT (supraventricular tachycardia)    Fibromyalgia    Nicotine dependence    PVC's (premature ventricular contractions)              Review of Systems:     CONSTITUTIONAL: NEGATIVE for fever, chills, change in weight  INTEGUMENTARY/SKIN: NEGATIVE for worrisome rashes, moles or lesions  EYES: NEGATIVE for vision changes or irritation  ENT/MOUTH: NEGATIVE for ear, mouth and throat problems  RESP: NEGATIVE for significant cough or SOB  BREAST: NEGATIVE for masses, tenderness or discharge  CV: NEGATIVE for chest pain, palpitations or peripheral edema  GI: NEGATIVE for nausea, abdominal pain, heartburn, or change in bowel habits  : NEGATIVE for frequency, dysuria, or hematuria  MUSCULOSKELETAL: NEGATIVE for significant arthralgias or myalgia  NEURO: NEGATIVE for weakness, dizziness or paresthesias  ENDOCRINE: NEGATIVE for temperature intolerance, skin/hair changes  HEME: NEGATIVE for bleeding problems  PSYCHIATRIC: NEGATIVE for changes in mood or affect             Physical Exam:   Vital signs not obtained due to nature of visit.  Constitutional:   fatigued, alert, cooperative, no apparent distress, appears younger than stated age.     Eyes:   extra-ocular muscles intact and sclera clear     ENT:   normocepalic, without obvious abnormality     Lungs:   no increased work of breathing and no retractions     Neuropsychiatric:   Affect: normal             Data:   Epic reviewed.     Disposition:  Follow-up in 12 weeks or as needed.    Niko Kruger MD  Internal Medicine  [facility] Team        Video-Visit Details     Type of service:    Joined the call at 1/15/2024, 9:41:18 am.  Left the call at 1/15/2024, 10:01:09 am.  You were on the call for 19  minutes 50 seconds .Video Visit  Originating Location (pt. Location): Home   Distant Location (provider location):  [facility]   Platform used for Video Visit: Giovannywell       no

## 2024-01-16 LAB
HBA1C MFR BLD: 5.7 %
TSH SERPL-ACNC: 1.92 UIU/ML (ref 0.3–4.5)

## 2024-01-19 ENCOUNTER — TELEPHONE (OUTPATIENT)
Dept: FAMILY MEDICINE | Facility: CLINIC | Age: 54
End: 2024-01-19
Payer: COMMERCIAL

## 2024-01-19 ENCOUNTER — MYC MEDICAL ADVICE (OUTPATIENT)
Dept: FAMILY MEDICINE | Facility: CLINIC | Age: 54
End: 2024-01-19
Payer: COMMERCIAL

## 2024-01-19 NOTE — TELEPHONE ENCOUNTER
Plan does not cover Semaglutide-Weight Management (WEGOVY) 0.25 MG/0.5ML pen .  Please go to [email or web address] to initiate Prior Auth or switch to alternative medication.    Insurance type and ID number: Key:  [id number]      Additional Information:     Kena Qureshi

## 2024-01-19 NOTE — TELEPHONE ENCOUNTER
See MyChart encounter below. Routing to provider to review and advise.    See TE today as well, looks like they are asking for prior auth of wegovy or alternative med. Thanks!      SURESH CardonaN, RN  [facility]

## 2024-01-24 NOTE — TELEPHONE ENCOUNTER
Plan does not cover tirzepatide-Weight Management (ZEPBOUND) 2.5 MG/0.5ML prefilled pen .  Please go to  [email or web address] to initiate Prior Auth or switch to alternative medication.    Insurance type and ID number: Key:  [id number]      Additional Information:     Kean Qureshi

## 2024-01-25 NOTE — TELEPHONE ENCOUNTER
ALEX HERNANDEZ (Key: [id number])  PA Case ID #: [id number]  Rx #: [id number]  Need Help? Call us at [phone]  Status: Sent to Plan today  Drug: Zepbound 2.5MG/0.5ML pen-injectors  Form: [employer] Medical Electronic Prior Authorization Form

## 2024-02-10 ENCOUNTER — TRANSFERRED RECORDS (OUTPATIENT)
Dept: MULTI SPECIALTY CLINIC | Facility: CLINIC | Age: 54
End: 2024-02-10
Payer: COMMERCIAL

## 2024-02-18 ENCOUNTER — MYC MEDICAL ADVICE (OUTPATIENT)
Dept: FAMILY MEDICINE | Facility: CLINIC | Age: 54
End: 2024-02-18
Payer: COMMERCIAL

## 2024-02-19 NOTE — TELEPHONE ENCOUNTER
Per chart review of 1/19 encounter it appears provider initiated PA. No update regarding status within that or any subsequent encounter.     Routing to provider to advise on the status of PA.     Lanette Mc RN

## 2024-02-21 NOTE — TELEPHONE ENCOUNTER
Please see details below.    ALEX HERNANDEZ (Key: [id number])  PA Case ID #: [id number]  Rx #: [id number]  Outcome  Contract Exclusion.Inquiry/Technical Denial.Your request for coverage of this medication is denied because it is not a covered benefit under your plan. Please see your plan document which describes weight loss drug coverage.

## 2024-03-01 ENCOUNTER — TELEPHONE (OUTPATIENT)
Dept: FAMILY MEDICINE | Facility: CLINIC | Age: 54
End: 2024-03-01
Payer: COMMERCIAL

## 2024-03-01 NOTE — TELEPHONE ENCOUNTER
Patient Quality Outreach    Patient is due for the following:   Colon Cancer Screening  Cervical Cancer Screening - PAP Needed      Topic Date Due    Pneumococcal Vaccine (1 of 2 - PCV) Never done    Zoster (Shingles) Vaccine (1 of 2) Never done    Flu Vaccine (1) 09/01/2023    COVID-19 Vaccine (4 - 2023-24 season) 09/01/2023       Next Steps:   Schedule a nurse only visit for immunization update    Type of outreach:    Sent [employer] message.      Questions for provider review:    None           Qi Cook MA

## 2024-09-21 ENCOUNTER — HEALTH MAINTENANCE LETTER (OUTPATIENT)
Age: 54
End: 2024-09-21

## 2025-06-07 ENCOUNTER — HEALTH MAINTENANCE LETTER (OUTPATIENT)
Age: 55
End: 2025-06-07